# Patient Record
Sex: FEMALE | Race: WHITE | NOT HISPANIC OR LATINO | Employment: FULL TIME | URBAN - NONMETROPOLITAN AREA
[De-identification: names, ages, dates, MRNs, and addresses within clinical notes are randomized per-mention and may not be internally consistent; named-entity substitution may affect disease eponyms.]

---

## 2017-02-10 ENCOUNTER — COMMUNICATION - GICH (OUTPATIENT)
Dept: FAMILY MEDICINE | Facility: OTHER | Age: 27
End: 2017-02-10

## 2017-03-18 ENCOUNTER — AMBULATORY - GICH (OUTPATIENT)
Dept: SCHEDULING | Facility: OTHER | Age: 27
End: 2017-03-18

## 2018-01-03 NOTE — TELEPHONE ENCOUNTER
Patient Information     Patient Name MRSue Mckay [id number] Female 1990      Telephone Encounter by Naomie Post at 2017  8:40 AM     Author:  Naomie Post Service:  (none) Author Type:  NURS- Registered Nurse     Filed:  2017  8:42 AM Encounter Date:  2/10/2017 Status:  Signed     :  Naomie Post (NURS- Registered Nurse)            Depression-in adults 18 and over  SSRI    Office visit in the past 12 months or as indicated in chart.  Should have clinic visit 1-2 months after initial prescription.    Last visit with SHERRY FRAZIER was on: 2016 in [facility]  Next visit with SHERRY FRAZIER is on: No future appointment listed with this provider  Next visit with Family Practice is on: No future appointment listed in this department    Max refills 12 months from last office visit or per providers notes.      PHQ Depression Screening 3/29/2016 2016   Date of PHQ exam (doc flow) 3/29/2016 2016   1. Lack of interest/pleasure 1 - Several days 0 - Not at all   2. Feeling down/depressed 1 - Several days 1 - Several days   PHQ-2 TOTAL SCORE 2 1   3. Trouble sleeping 1 - Several days 0 - Not at all   4. Decreased energy 1 - Several days 2 - More than half the days   5. Appetite change 0 - Not at all 2 - More than half the days   6. Feelings of failure 0 - Not at all 1 - Several days   7. Trouble concentrating 0 - Not at all 1 - Several days   8. Activity level 0 - Not at all 0 - Not at all   9. Hurting yourself 0 - Not at all 0 - Not at all   PHQ-9 TOTAL SCORE 4 7   PHQ-9 Severity Level none mild   Functional Impairment somewhat difficult somewhat difficult     Patient is due for medication management appointment. Limited refill provided at this time and letter sent for reminder to patient. Prescription refilled per RN Medication Refill Policy.................... Naomie Post RN ....................  2017   8:42 AM

## 2018-01-26 ENCOUNTER — DOCUMENTATION ONLY (OUTPATIENT)
Dept: FAMILY MEDICINE | Facility: OTHER | Age: 28
End: 2018-01-26

## 2018-01-26 RX ORDER — ACETAMINOPHEN 325 MG/1
650 TABLET ORAL EVERY 4 HOURS PRN
COMMUNITY
Start: 2015-10-25 | End: 2022-10-19

## 2018-01-26 RX ORDER — MEDROXYPROGESTERONE ACETATE 150 MG/ML
150 INJECTION, SUSPENSION INTRAMUSCULAR
COMMUNITY
Start: 2016-06-20 | End: 2021-04-01

## 2018-01-26 RX ORDER — RIZATRIPTAN BENZOATE 10 MG/1
10 TABLET, ORALLY DISINTEGRATING ORAL
COMMUNITY
Start: 2016-02-18 | End: 2021-04-01

## 2018-01-26 RX ORDER — CYCLOBENZAPRINE HCL 10 MG
10 TABLET ORAL 3 TIMES DAILY
COMMUNITY
Start: 2016-10-27 | End: 2021-04-01

## 2018-01-26 RX ORDER — AMITRIPTYLINE HYDROCHLORIDE 10 MG/1
10 TABLET ORAL AT BEDTIME
COMMUNITY
Start: 2016-05-26 | End: 2021-04-01

## 2018-01-26 RX ORDER — CITALOPRAM HYDROBROMIDE 40 MG/1
40 TABLET ORAL DAILY
COMMUNITY
Start: 2017-02-13 | End: 2018-06-26

## 2018-03-25 ENCOUNTER — HEALTH MAINTENANCE LETTER (OUTPATIENT)
Age: 28
End: 2018-03-25

## 2018-06-26 RX ORDER — PAROXETINE 10 MG/1
10 TABLET, FILM COATED ORAL AT BEDTIME
Qty: 90 TABLET | Refills: 3 | Status: SHIPPED | OUTPATIENT
Start: 2018-06-26 | End: 2021-04-01

## 2018-06-27 ENCOUNTER — TELEPHONE (OUTPATIENT)
Dept: FAMILY MEDICINE | Facility: OTHER | Age: 28
End: 2018-06-27

## 2018-06-27 NOTE — TELEPHONE ENCOUNTER
No answer, unable to leave message.  I did refax her prescription to the [facility] in [place].  Winnie Riddle CMA..............6/27/2018........3:33 PM

## 2018-07-24 NOTE — PROGRESS NOTES
Patient Information     Patient Name  Sue De La Fuente MRN  [id number] Sex  Female   1990      Letter by Hemant Whelan MD at      Author:  Hemant Whelan MD Service:  (none) Author Type:  (none)    Filed:   Encounter Date:  2/10/2017 Status:  (Other)           Sue De La Fuente  [address] [place] [id number]          2017    Dear Ms. De La Fuente:    A LIMITED refill of citalopram (CELEXA) 40 mg tablet has been called into your pharmacy.    Additional refills require a medication management  appointment with Hemant Whelan MD. Please call the clinic at [phone] to schedule your appointment.    Thank you,    The Refill Nurse  [facility]

## 2018-12-21 ENCOUNTER — HOSPITAL ENCOUNTER (EMERGENCY)
Facility: OTHER | Age: 28
Discharge: HOME | End: 2018-12-21
Attending: PHYSICIAN ASSISTANT | Admitting: PHYSICIAN ASSISTANT

## 2018-12-21 VITALS
HEIGHT: 62 IN | TEMPERATURE: 97.6 F | BODY MASS INDEX: 24.84 KG/M2 | RESPIRATION RATE: 16 BRPM | SYSTOLIC BLOOD PRESSURE: 121 MMHG | OXYGEN SATURATION: 97 % | WEIGHT: 135 LBS | DIASTOLIC BLOOD PRESSURE: 78 MMHG

## 2018-12-21 DIAGNOSIS — F32.A DEPRESSION: ICD-10-CM

## 2018-12-21 DIAGNOSIS — Z72.89 DELIBERATE SELF-CUTTING: ICD-10-CM

## 2018-12-21 PROCEDURE — 99283 EMERGENCY DEPT VISIT LOW MDM: CPT | Performed by: PHYSICIAN ASSISTANT

## 2018-12-21 PROCEDURE — 99283 EMERGENCY DEPT VISIT LOW MDM: CPT | Mod: Z6 | Performed by: PHYSICIAN ASSISTANT

## 2018-12-21 RX ORDER — VENLAFAXINE HYDROCHLORIDE 37.5 MG/1
37.5 TABLET, EXTENDED RELEASE ORAL DAILY
Qty: 30 TABLET | Refills: 0 | Status: SHIPPED | OUTPATIENT
Start: 2018-12-21 | End: 2021-04-01

## 2018-12-21 ASSESSMENT — ENCOUNTER SYMPTOMS
BACK PAIN: 0
BRUISES/BLEEDS EASILY: 0
SLEEP DISTURBANCE: 1
DYSPHORIC MOOD: 1
CHILLS: 0
SHORTNESS OF BREATH: 0
CHEST TIGHTNESS: 0
HYPERACTIVE: 0
FEVER: 0
AGITATION: 0
ABDOMINAL PAIN: 0
CONFUSION: 0
WOUND: 0
HEMATURIA: 0
ADENOPATHY: 0
NERVOUS/ANXIOUS: 0

## 2018-12-21 ASSESSMENT — MIFFLIN-ST. JEOR: SCORE: 1295.61

## 2018-12-21 NOTE — ED AVS SNAPSHOT
[facility]  [address]  [place] [address]  Phone:  [phone]  Fax:  [phone]                                    Sue De La Fuente   MRN: [id number]    Department:  [facility]   Date of Visit:  12/21/2018           After Visit Summary Signature Page    I have received my discharge instructions, and my questions have been answered. I have discussed any challenges I see with this plan with the nurse or doctor.    ..........................................................................................................................................  Patient/Patient Representative Signature      ..........................................................................................................................................  Patient Representative Print Name and Relationship to Patient    ..................................................               ................................................  Date                                   Time    ..........................................................................................................................................  Reviewed by Signature/Title    ...................................................              ..............................................  Date                                               Time          22EPIC Rev 08/18

## 2018-12-21 NOTE — PROGRESS NOTES
:    Called Denia,  at notified that patient does not have insurance listed. Denia stated that she would follow up with ED.     TY Odonnell on 12/21/2018 at 12:01 PM

## 2018-12-21 NOTE — DISCHARGE INSTRUCTIONS
The financial worker Denia, should be contacting you to assist with setting up insurance. Her number is [phone]  Your Rx is covered today, please fill at the [facility]

## 2018-12-21 NOTE — ED PROVIDER NOTES
History     Chief Complaint   Patient presents with     Self Injury     from home with PD.      This is a 28-year-old female who admits that she has been having increasing depression and has been struggling with wanting to inflict self-harm.  Denies any suicidal ideation or plan.  Last night she was drinking alcohol when she started cutting with the intent to inflict pain upon herself.  She primarily cut her left forearm.  She then called a friend of hers for support and she subsequently left to go over to his house.  Her roommate and friend woke up this morning and noticed her missing and called [employer].  Who found the patient at her friend's house with blood on a knife as well as cutting to her forearms.  She has been cooperative and is agreeable to establishing a safety plan.  She also admits that her biggest issue is she has no insurance right now and is currently not on any medications.              Problem List:    Patient Active Problem List    Diagnosis Date Noted     Carpal tunnel syndrome of right wrist 05/26/2016     Priority: Medium     Encounter for initial prescription of injectable contraceptive 03/29/2016     Priority: Medium     Post partum depression 01/21/2016     Priority: Medium     Vaginal yeast infection 10/01/2015     Priority: Medium     Recurrent umbilical hernia 08/05/2015     Priority: Medium     Headache 03/28/2012     Priority: Medium        Past Medical History:    Past Medical History:   Diagnosis Date     Headache        Past Surgical History:    Past Surgical History:   Procedure Laterality Date     OTHER SURGICAL HISTORY      2009,,HERNIA REPAIR     OTHER SURGICAL HISTORY      11/17/15,,HERNIA REPAIR,[facility] with [facility] underlay       Family History:    History reviewed. No pertinent family history.    Social History:  Marital Status:  Single [1]  Social History     Tobacco Use     Smoking status: Current Every Day Smoker     Packs/day: 0.50     Years: 10.00     Pack years: 5.00  "    Types: Cigarettes     Smokeless tobacco: Former User     Types: Chew   Substance Use Topics     Alcohol use: Yes     Alcohol/week: 3.0 oz     Types: 5 Cans of beer per week     Drug use: No        Medications:      acetaminophen (TYLENOL) 325 MG tablet   amitriptyline (ELAVIL) 10 MG tablet   cyclobenzaprine (FLEXERIL) 10 MG tablet   medroxyPROGESTERone (DEPO-PROVERA) 150 MG/ML injection   PARoxetine (PAXIL) 10 MG tablet   rizatriptan (MAXALT-MLT) 10 MG ODT tab   traMADol (ULTRAM) 50 MG tablet         Review of Systems   Constitutional: Negative for chills and fever.   HENT: Negative for congestion.    Eyes: Negative for visual disturbance.   Respiratory: Negative for chest tightness and shortness of breath.    Cardiovascular: Negative for chest pain.   Gastrointestinal: Negative for abdominal pain.   Genitourinary: Negative for hematuria.   Musculoskeletal: Negative for back pain.   Skin: Negative for rash and wound.   Neurological: Negative for syncope.   Hematological: Negative for adenopathy. Does not bruise/bleed easily.   Psychiatric/Behavioral: Positive for behavioral problems, dysphoric mood, self-injury and sleep disturbance. Negative for agitation, confusion and suicidal ideas. The patient is not nervous/anxious and is not hyperactive.        Physical Exam   BP: 136/79  Heart Rate: 109  Temp: 97.6  F (36.4  C)  Resp: 18  Height: 157.5 cm (5' 2\")  Weight: 61.2 kg (135 lb)  SpO2: 98 %      Physical Exam   Constitutional: She appears well-developed and well-nourished. No distress.   HENT:   Head: Normocephalic and atraumatic.   Eyes: Conjunctivae and EOM are normal. Pupils are equal, round, and reactive to light. No scleral icterus.   Neck: Neck supple.   Cardiovascular: Normal rate and regular rhythm.   Pulmonary/Chest: Effort normal.   Abdominal: Soft. There is no tenderness.   Musculoskeletal: Normal range of motion. She exhibits no deformity.        Arms:  Multiple superficial abrasions to her left " distal forearm around her wrist.  None are repairable or require suturing.  CMS x4.   Lymphadenopathy:     She has no cervical adenopathy.   Neurological: She is alert.   Skin: Skin is warm and dry. No rash noted. She is not diaphoretic.   Psychiatric: She has a normal mood and affect.       ED Course        Procedures             No results found for this or any previous visit (from the past 24 hour(s)).    Medications - No data to display    Assessments & Plan (with Medical Decision Making)     I have reviewed the nursing notes.    I have reviewed the findings, diagnosis, plan and need for follow up with the patient.         Medication List      Started    venlafaxine 37.5 MG 24 hr tablet  Commonly known as:  EFFEXOR-ER  37.5 mg, Oral, DAILY            Final diagnoses:   Depression   Deliberate self-cutting     Afebrile.  Vital signs stable.  Increasing depression with self cutting behavior.  Patient has had insurance issues and has not not been able to fill her medications.  CRT evaluation and patient is cooperative and agreeable to a safety plan.   referral for assistance with IM care.  Rx for Effexor for depression.  She reports that she has been on this but I do not see this anywhere in her records.  Start her at the initial dose of 37.5 mg.  She will need to follow-up with her primary care provider in 5 days for further evaluation and adjustment of this medication as needed.  Follow-up sooner if there is any other concerns problems or questions 12/21/2018   [facility]     Jake Jensen PA-C  12/21/18 [id number]

## 2018-12-21 NOTE — ED TRIAGE NOTES
"Patient to ER with police. She reports that she was struggling last night with depressive symptoms and drinking, she was cutting last night with the intent to inflict pain upon herself, she denies SI. She reports drinking \"weekly\". She states that she then called a friend for support and went to his house.   Her roommate and friend called PD this morning as patient was missing, and he found blood and knife in kitchen. Her 3 year old son was present with roommate, officers placed him in the care of patient's mother.   Patient calm and cooperative. Agrees to remain safe while in ER.   "

## 2018-12-21 NOTE — PROGRESS NOTES
":    Per PA, patient will need medication at discharge and does not have insurance to cover medication at this time. Called Kennedy at [facility] to discuss Jake Care for patient. Per Kennedy, script can be sent to pharmacy with \"jake care\" written on it. Discussed this with PA and RN. No further needs.     TY Odonnell on 12/21/2018 at 1:37 PM    "

## 2021-04-01 ENCOUNTER — HOSPITAL ENCOUNTER (OUTPATIENT)
Dept: GENERAL RADIOLOGY | Facility: OTHER | Age: 31
End: 2021-04-01
Attending: NURSE PRACTITIONER
Payer: COMMERCIAL

## 2021-04-01 ENCOUNTER — OFFICE VISIT (OUTPATIENT)
Dept: FAMILY MEDICINE | Facility: OTHER | Age: 31
End: 2021-04-01
Attending: NURSE PRACTITIONER
Payer: COMMERCIAL

## 2021-04-01 VITALS
RESPIRATION RATE: 18 BRPM | WEIGHT: 162.31 LBS | HEART RATE: 83 BPM | DIASTOLIC BLOOD PRESSURE: 68 MMHG | OXYGEN SATURATION: 98 % | HEIGHT: 62 IN | TEMPERATURE: 97.7 F | BODY MASS INDEX: 29.87 KG/M2 | SYSTOLIC BLOOD PRESSURE: 134 MMHG

## 2021-04-01 DIAGNOSIS — M25.511 ACUTE PAIN OF RIGHT SHOULDER: Primary | ICD-10-CM

## 2021-04-01 PROCEDURE — G0463 HOSPITAL OUTPT CLINIC VISIT: HCPCS | Mod: 25

## 2021-04-01 PROCEDURE — 73030 X-RAY EXAM OF SHOULDER: CPT | Mod: RT

## 2021-04-01 PROCEDURE — 99213 OFFICE O/P EST LOW 20 MIN: CPT | Performed by: NURSE PRACTITIONER

## 2021-04-01 PROCEDURE — G0463 HOSPITAL OUTPT CLINIC VISIT: HCPCS

## 2021-04-01 ASSESSMENT — PAIN SCALES - GENERAL: PAINLEVEL: SEVERE PAIN (7)

## 2021-04-01 ASSESSMENT — MIFFLIN-ST. JEOR: SCORE: 1409.49

## 2021-04-01 NOTE — PROGRESS NOTES
HPI:    Sue De La Fuente is a 30 year old female  who presents to [facility] today for right shoulder pain and right arm numbness. The patient reports that symptoms started 2 weeks ago. No known injury to her right shoulder. 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 The numbness of her right shoulder started about 1.5 weeks ago. Tried ice and heat, which helps temporarily. The patient has been taking tylenol and ibuprofen, which does not help.      Past Medical History:   Diagnosis Date     Headache     3/28/2012     Past Surgical History:   Procedure Laterality Date     OTHER SURGICAL HISTORY      2009,,HERNIA REPAIR     OTHER SURGICAL HISTORY      11/17/15,,HERNIA REPAIR,[facility] with [facility] underlay     Social History     Tobacco Use     Smoking status: Current Every Day Smoker     Packs/day: 0.50     Years: 10.00     Pack years: 5.00     Types: Cigarettes     Smokeless tobacco: Former User     Types: Chew   Substance Use Topics     Alcohol use: Yes     Alcohol/week: 5.0 standard drinks     Types: 5 Cans of beer per week     Current Outpatient Medications   Medication Sig Dispense Refill     acetaminophen (TYLENOL) 325 MG tablet Take 650 mg by mouth every 4 hours as needed for pain Max acetaminophen dose: 4000mg in 24 hrs.       Allergies   Allergen Reactions     Sulfa Drugs Anaphylaxis     Cats      Dogs      Sumatriptan      Side effects from sumatriptan compounded by using it with her Celexa and Lamictal which may increase blood levels and serotonin effect.  Stay off the sumatriptin at least until she talks to her provider. If they want to use it again, may have to consider a lower dose.          Past medical history, past surgical history, current medications and allergies reviewed and accurate to the best of my knowledge.        ROS:  Refer to HPI    /68 (BP Location: Left arm, Patient Position: Sitting, Cuff Size:  "Adult Regular)   Pulse 83   Temp 97.7  F (36.5  C) (Tympanic)   Resp 18   Ht 1.575 m (5' 2\")   Wt 73.6 kg (162 lb 5 oz)   LMP 03/31/2021 (Exact Date)   SpO2 98%   Breastfeeding No   BMI 29.69 kg/m      EXAM:  General Appearance: Well appearing female, appropriate appearance for age. No acute distress  Musculoskeletal:  Patient is only able to complete 90 degrees of flexion without causing pain and numbness to her right hand. Patient states that as she continues to flex her right shoulder the numbness continues up her right arm and into her shoulder.  Equal movement of bilateral lower extremities.  Normal gait. Patient had full ROM of her neck.    Dermatological: no rashes noted of exposed skin. No erythema, ecchymosis or swelling of the right shoulder.   Psychological: normal affect, alert, oriented, and pleasant.             Xray:  Results for orders placed or performed in visit on 04/01/21   XR Shoulder Right 2 Views     Status: None    Narrative    XR SHOULDER 2 VIEW RIGHT    HISTORY: 30 years Female Acute pain of right shoulder    COMPARISON: None    TECHNIQUE: Right shoulder 2 views    FINDINGS: Joint spaces are congruent. Articular surfaces are smooth.  There is no evidence of fracture or dislocation.      Impression    IMPRESSION: No evidence of fracture or dislocation right shoulder.    JOEL SOL MD           ASSESSMENT/PLAN:  1. Acute pain of right shoulder    - XR Shoulder Right 2 Views  - Orthopedic & Spine  Referral; Future  - PHYSICAL THERAPY REFERRAL; Future    Xray films and radiology report reviewed.     Discussed xray results with the patient and informed her that there were no bony abnormalities seen on xray.     Discussed with the patient that differentials include nerve impingement or tendinitis. Treatment of the patient's right shoulder pain right now includes symptomatic treatment which includes, applying ice/heat to the shoulder, alternating tylenol and ibuprofen and " performing passive ROM exercises.     A referral was ordered for Physical therapy and  for follow up.      Discussed warning signs/symptoms indicative of need to f/u    Follow up if symptoms persist or worsen or concerns      I explained my diagnostic considerations and recommendations to the patient, who voiced understanding and agreement with the treatment plan. All questions were answered. We discussed potential side effects of any prescribed or recommended therapies, as well as expectations for response to treatments.    Disclaimer:  This note consists of words and symbols derived from keyboarding, dictation, or using voice recognition software. As a result, there may be errors in the script that have gone undetected. Please consider this when interpreting information found in this note.

## 2021-04-01 NOTE — NURSING NOTE
Patient presents to clinic experiencing right shoulder pain rated 7, numbness to right arm and she is unable to raise arm straight out.  Medication Reconciliation: complete    Joan Houston LPN

## 2021-04-01 NOTE — PATIENT INSTRUCTIONS
Patient Education     Pendulum (Flexibility)    1. Lean over next to a table, with your left arm supporting your weight on the table.  2. Relax your right arm and let it hang straight down.  3. Slowly begin to swing your right arm in a small [place]. Gradually make the [place] bigger if you can. Change direction after 1 minute of motion.  4. Next, swing your right arm backward and forward. Then move it side to side. Change direction after 1 minute of motion.  5. Repeat these movements for about 5 minutes.  6. Switch sides and repeat if instructed.  7. Do this exercise 3 times a day, or as often as instructed.  [employer] last reviewed this educational content on 2/1/2020 2000-2020 [employer]. All rights reserved. This information is not intended as a substitute for professional medical care. Always follow your healthcare professional's instructions.

## 2021-04-12 ENCOUNTER — OFFICE VISIT (OUTPATIENT)
Dept: FAMILY MEDICINE | Facility: OTHER | Age: 31
End: 2021-04-12
Attending: NURSE PRACTITIONER
Payer: COMMERCIAL

## 2021-04-12 VITALS
DIASTOLIC BLOOD PRESSURE: 74 MMHG | RESPIRATION RATE: 16 BRPM | HEIGHT: 62 IN | TEMPERATURE: 98.1 F | HEART RATE: 86 BPM | SYSTOLIC BLOOD PRESSURE: 132 MMHG | OXYGEN SATURATION: 97 % | BODY MASS INDEX: 29.68 KG/M2 | WEIGHT: 161.31 LBS

## 2021-04-12 DIAGNOSIS — R39.89 SUSPECTED UTI: ICD-10-CM

## 2021-04-12 DIAGNOSIS — R30.0 DYSURIA: ICD-10-CM

## 2021-04-12 DIAGNOSIS — R35.0 URINARY FREQUENCY: Primary | ICD-10-CM

## 2021-04-12 LAB
ALBUMIN UR-MCNC: NEGATIVE MG/DL
APPEARANCE UR: CLEAR
BACTERIA #/AREA URNS HPF: ABNORMAL /HPF
BILIRUB UR QL STRIP: NEGATIVE
COLOR UR AUTO: YELLOW
GLUCOSE UR STRIP-MCNC: NEGATIVE MG/DL
HCG UR QL: NEGATIVE
HGB UR QL STRIP: NEGATIVE
KETONES UR STRIP-MCNC: NEGATIVE MG/DL
LEUKOCYTE ESTERASE UR QL STRIP: NEGATIVE
NITRATE UR QL: POSITIVE
PH UR STRIP: 8 PH (ref 5–9)
RBC #/AREA URNS AUTO: ABNORMAL /HPF
SOURCE: ABNORMAL
SP GR UR STRIP: 1.02 (ref 1–1.03)
UROBILINOGEN UR STRIP-ACNC: 0.2 EU/DL (ref 0.2–1)
WBC #/AREA URNS AUTO: ABNORMAL /HPF

## 2021-04-12 PROCEDURE — 87088 URINE BACTERIA CULTURE: CPT | Mod: ZL | Performed by: NURSE PRACTITIONER

## 2021-04-12 PROCEDURE — 81001 URINALYSIS AUTO W/SCOPE: CPT | Mod: ZL | Performed by: NURSE PRACTITIONER

## 2021-04-12 PROCEDURE — 81025 URINE PREGNANCY TEST: CPT | Mod: ZL | Performed by: NURSE PRACTITIONER

## 2021-04-12 PROCEDURE — 87086 URINE CULTURE/COLONY COUNT: CPT | Mod: ZL | Performed by: NURSE PRACTITIONER

## 2021-04-12 PROCEDURE — 99213 OFFICE O/P EST LOW 20 MIN: CPT | Performed by: NURSE PRACTITIONER

## 2021-04-12 PROCEDURE — G0463 HOSPITAL OUTPT CLINIC VISIT: HCPCS

## 2021-04-12 RX ORDER — PHENAZOPYRIDINE HYDROCHLORIDE 100 MG/1
100 TABLET, FILM COATED ORAL 3 TIMES DAILY PRN
Qty: 6 TABLET | Refills: 0 | Status: SHIPPED | OUTPATIENT
Start: 2021-04-12 | End: 2023-02-02

## 2021-04-12 RX ORDER — NITROFURANTOIN 25; 75 MG/1; MG/1
100 CAPSULE ORAL 2 TIMES DAILY
Qty: 10 CAPSULE | Refills: 0 | Status: SHIPPED | OUTPATIENT
Start: 2021-04-12 | End: 2021-04-17

## 2021-04-12 ASSESSMENT — PAIN SCALES - GENERAL: PAINLEVEL: SEVERE PAIN (7)

## 2021-04-12 ASSESSMENT — MIFFLIN-ST. JEOR: SCORE: 1404.96

## 2021-04-12 NOTE — NURSING NOTE
Patient presents to clinic experiencing urinary frequency, retention, pressure and lower abdominal pain rated 7.   Medication Reconciliation: complete    Joan Houston LPN

## 2021-04-12 NOTE — PROGRESS NOTES
"HPI:    Sue De La Fuente is a 30 year old female  who presents to [facility] today for dysuria, frequency, urgency and pelvic pain. Patient reports that her symptoms started a few days ago. Denies hematuria. Patient reports chills. Denies fever. Denies abnormal vaginal discharge. LMP was 2 weeks ago.     Past Medical History:   Diagnosis Date     Headache     3/28/2012     Past Surgical History:   Procedure Laterality Date     OTHER SURGICAL HISTORY      2009,,HERNIA REPAIR     OTHER SURGICAL HISTORY      11/17/15,,HERNIA REPAIR,[facility] with [facility] underlay     Social History     Tobacco Use     Smoking status: Current Every Day Smoker     Packs/day: 0.50     Years: 10.00     Pack years: 5.00     Types: Cigarettes     Smokeless tobacco: Former User     Types: Chew   Substance Use Topics     Alcohol use: Yes     Alcohol/week: 5.0 standard drinks     Types: 5 Cans of beer per week     Current Outpatient Medications   Medication Sig Dispense Refill     acetaminophen (TYLENOL) 325 MG tablet Take 650 mg by mouth every 4 hours as needed for pain Max acetaminophen dose: 4000mg in 24 hrs.       Allergies   Allergen Reactions     Sulfa Drugs Anaphylaxis     Cats      Dogs      Sumatriptan      Side effects from sumatriptan compounded by using it with her Celexa and Lamictal which may increase blood levels and serotonin effect.  Stay off the sumatriptin at least until she talks to her provider. If they want to use it again, may have to consider a lower dose.          Past medical history, past surgical history, current medications and allergies reviewed and accurate to the best of my knowledge.        ROS:  Refer to HPI    /74 (BP Location: Left arm, Patient Position: Sitting, Cuff Size: Adult Regular)   Pulse 86   Temp 98.1  F (36.7  C) (Tympanic)   Resp 16   Ht 1.575 m (5' 2\")   Wt 73.2 kg (161 lb 5 oz)   LMP 03/31/2021 (Exact Date)   SpO2 97%   Breastfeeding No   BMI 29.50 kg/m      EXAM:  General " Appearance: Well appearing female, appropriate appearance for age. No acute distress  Abdomen: soft, nontender, no rigidity, no rebound tenderness or guarding, normal bowel sounds present  :  Patient reports suprapubic tenderness to palpation.  No CVA tenderness to palpation.    Psychological: normal affect, alert, oriented, and pleasant.       Labs:  Results for orders placed or performed in visit on 04/12/21   *UA reflex to Microscopic     Status: Abnormal   Result Value Ref Range    Color Urine Yellow     Appearance Urine Clear     Glucose Urine Negative NEG^Negative mg/dL    Bilirubin Urine Negative NEG^Negative    Ketones Urine Negative NEG^Negative mg/dL    Specific Gravity Urine 1.020 1.000 - 1.030    Blood Urine Negative NEG^Negative    pH Urine 8.0 5.0 - 9.0 pH    Protein Albumin Urine Negative NEG^Negative mg/dL    Urobilinogen Urine 0.2 0.2 - 1.0 EU/dL    Nitrite Urine Positive (A) NEG^Negative    Leukocyte Esterase Urine Negative NEG^Negative    Source Midstream Urine    Pregnancy, Urine (HCG)     Status: None   Result Value Ref Range    HCG Qual Urine Negative NEG^Negative   Urine Microscopic     Status: Abnormal   Result Value Ref Range    WBC Urine 0 - 5 OTO5^0 - 5 /HPF    RBC Urine O - 2 OTO2^O - 2 /HPF    Bacteria Urine Moderate (A) NEG^Negative /HPF                 ASSESSMENT/PLAN:  1. Urinary frequency    - UA reflex to Microscopic; Future  - *UA reflex to Microscopic  - Pregnancy, Urine (HCG)  - Urine Culture Aerobic Bacterial  - Urine Microscopic    2. Suspected UTI    - nitroFURantoin macrocrystal-monohydrate (MACROBID) 100 MG capsule; Take 1 capsule (100 mg) by mouth 2 times daily for 5 days  Dispense: 10 capsule; Refill: 0    3. Dysuria    - phenazopyridine (PYRIDIUM) 100 MG tablet; Take 1 tablet (100 mg) by mouth 3 times daily as needed for urinary tract discomfort  Dispense: 6 tablet; Refill: 0    UA showed positive nitrites.     Urine microscopic showed moderate amount of bacteria.      Discussed urine results with the patient and informed her that she will be treated for a suspected UTI based on her symptoms and urine results. Patient was prescribed macrobid.     The patient was also prescribed pyridium PRN for her dysuria.       Encouraged fluids.    May use over-the-counter Tylenol or ibuprofen PRN    Discussed warning signs/symptoms indicative of need to f/u    Follow up if symptoms persist or worsen or concerns      I explained my diagnostic considerations and recommendations to the patient, who voiced understanding and agreement with the treatment plan. All questions were answered. We discussed potential side effects of any prescribed or recommended therapies, as well as expectations for response to treatments.    Disclaimer:  This note consists of words and symbols derived from keyboarding, dictation, or using voice recognition software. As a result, there may be errors in the script that have gone undetected. Please consider this when interpreting information found in this note.

## 2021-04-12 NOTE — PATIENT INSTRUCTIONS

## 2021-04-14 LAB
BACTERIA SPEC CULT: ABNORMAL
SPECIMEN SOURCE: ABNORMAL

## 2022-10-03 ENCOUNTER — OFFICE VISIT (OUTPATIENT)
Dept: FAMILY MEDICINE | Facility: OTHER | Age: 32
End: 2022-10-03
Attending: NURSE PRACTITIONER
Payer: COMMERCIAL

## 2022-10-03 VITALS
SYSTOLIC BLOOD PRESSURE: 124 MMHG | RESPIRATION RATE: 18 BRPM | DIASTOLIC BLOOD PRESSURE: 84 MMHG | WEIGHT: 158 LBS | OXYGEN SATURATION: 97 % | BODY MASS INDEX: 28.9 KG/M2 | TEMPERATURE: 97.8 F | HEART RATE: 90 BPM

## 2022-10-03 DIAGNOSIS — J40 BRONCHITIS: ICD-10-CM

## 2022-10-03 DIAGNOSIS — J02.9 SORE THROAT: Primary | ICD-10-CM

## 2022-10-03 DIAGNOSIS — T36.95XA ANTIBIOTIC-INDUCED YEAST INFECTION: ICD-10-CM

## 2022-10-03 DIAGNOSIS — J03.90 TONSILLITIS: ICD-10-CM

## 2022-10-03 DIAGNOSIS — B37.9 ANTIBIOTIC-INDUCED YEAST INFECTION: ICD-10-CM

## 2022-10-03 LAB — GROUP A STREP BY PCR: NOT DETECTED

## 2022-10-03 PROCEDURE — 87651 STREP A DNA AMP PROBE: CPT | Mod: ZL | Performed by: PHYSICIAN ASSISTANT

## 2022-10-03 PROCEDURE — G0463 HOSPITAL OUTPT CLINIC VISIT: HCPCS

## 2022-10-03 PROCEDURE — 99213 OFFICE O/P EST LOW 20 MIN: CPT | Performed by: PHYSICIAN ASSISTANT

## 2022-10-03 RX ORDER — AMOXICILLIN 875 MG
875 TABLET ORAL 2 TIMES DAILY
Qty: 14 TABLET | Refills: 0 | Status: SHIPPED | OUTPATIENT
Start: 2022-10-03 | End: 2022-10-10

## 2022-10-03 RX ORDER — FLUCONAZOLE 150 MG/1
150 TABLET ORAL
Qty: 3 TABLET | Refills: 0 | Status: SHIPPED | OUTPATIENT
Start: 2022-10-03 | End: 2022-10-10

## 2022-10-03 ASSESSMENT — PAIN SCALES - GENERAL: PAINLEVEL: MODERATE PAIN (4)

## 2022-10-03 NOTE — LETTER
[facility]  [address]  [place] [address]  Phone: [phone]  Fax: [phone]    October 3, 2022        Sue De La Fuente  [address] [place] [id number]          To whom it may concern:    RE: Sue De La Fuente    Patient was seen and treated today at our clinic and missed work 10/3/22 and 10/4/22.     Please contact me for questions or concerns.      Sincerely,        Laura Coronel PA-C

## 2022-10-03 NOTE — NURSING NOTE
"Chief Complaint   Patient presents with     Cough     For over a week     She has had cough, congestion,fever, fatigue and a sore throat for over a week.  Martha Valenzuela LPN..................10/3/2022   6:42 PM    Initial /84   Pulse 90   Temp 97.8  F (36.6  C) (Temporal)   Resp 18   Wt 71.7 kg (158 lb)   LMP 10/01/2022   SpO2 97%   Breastfeeding No   BMI 28.90 kg/m   Estimated body mass index is 28.9 kg/m  as calculated from the following:    Height as of 4/12/21: 1.575 m (5' 2\").    Weight as of this encounter: 71.7 kg (158 lb).  Medication Reconciliation: complete    FOOD SECURITY SCREENING QUESTIONS  Hunger Vital Signs:  Within the past 12 months we worried whether our food would run out before we got money to buy more. Never  Within the past 12 months the food we bought just didn't last and we didn't have money to get more. Never        Advance care directive on file? no  Advance care directive provided to patient? declined     Martha Valenzuela, LLUVIA  "

## 2022-10-03 NOTE — PROGRESS NOTES
ASSESSMENT/PLAN:    I have reviewed the nursing notes.  I have reviewed the findings, diagnosis, plan and need for follow up with the patient.    1. Sore throat  - Group A Streptococcus PCR Throat Swab  - Strep in process, will update on results as available    2. Bronchitis  - amoxicillin (AMOXIL) 875 MG tablet; Take 1 tablet (875 mg) by mouth 2 times daily for 7 days  Dispense: 14 tablet; Refill: 0  - Vital signs stable. PE consistent with URI. Recommend: increased fluids, rest, use of humidifier, antitussives (cough medication for adults), alternating tylenol and ibuprofen every 4-6 hours as needed (if able to take these medications), salt water gargles for sore throats or throat lozenges, honey, netti pots/saline rinses for sinus/congestion, as well as other home remedies. If worsening fevers, chills, uncontrollable nausea/vomiting, dehydration,etc., or other worsening signs occur, patient is agreeable to follow up for reevaluation. Cough may persist for 6-8 weeks. Tobacco cessation recommended.     3. Tonsillitis  - amoxicillin (AMOXIL) 875 MG tablet; Take 1 tablet (875 mg) by mouth 2 times daily for 7 days  Dispense: 14 tablet; Refill: 0  - Vital signs stable. PE notable for posterior pharyngeal erythema and tonsil findings include: 3+. Strep test in process. Discussed that for tonsillitis we typically treat with antibiotics with PCN class being first line unless allergy present. Patient was placed on Amoxicillin (875 mg dosing to cover respiratory pathology, 7 day course for this reason). Recommend taking entire course of antibiotic even if feeling better prior to this.  Recommend changing toothbrush on day 2.  Recommend alternating Tylenol and ibuprofen every 4-6 hours as needed (do not exceed daily limits of 4000 mg of Tylenol and 3200 mg of ibuprofen daily).  Also recommend salt water gargles, humidifier, throat lozenges if old enough not to be a choking hazard, warm honey if greater than 12 months in age,  other home remedies as needed.  May also benefit from using a humidifier.  If changing or worsening symptoms such as: Worsening fevers, pain, inability to handle own secretions, etc., recommend follow-up. Patient is in agreement and understanding of the above treatment plan. All questions and concerns were addressed and answered to patient's satisfaction. AVS reviewed with patient.     4. Antibiotic-induced yeast infection  - fluconazole (DIFLUCAN) 150 MG tablet; Take 1 tablet (150 mg) by mouth every 3 days for 3 doses  Dispense: 3 tablet; Refill: 0  - Take medication with food. Increase yogurt/probiotic. Diflucan x3 provided.   Discussed warning signs/symptoms indicative of need to f/u    Follow up if symptoms persist or worsen or concerns    I explained my diagnostic considerations and recommendations to the patient, who voiced understanding and agreement with the treatment plan. All questions were answered. We discussed potential side effects of any prescribed or recommended therapies, as well as expectations for response to treatments.    Laura Coronel PA-C  10/3/2022  6:38 PM    HPI:    Sue De La Fuente is a 32 year old female  who presents to [facility] today for concerns of URI, x 1 week    Symptoms:  Yes fevers or chills. Fever, highest reported temperature: 101.9 F  Yes sore throat/pharyngitis/tonsillitis.   Yes allergy/URI Symptoms  No Muffled Sounds/Change in Hearing  No Sensation of Fullness in Ear(s)  No Ringing in Ears/Tinnitus  No Balance Changes  No Dizziness  Yes Congestion (head/nasal/chest)  Yes Cough/Productive Cough  Yes Post Nasal Drip - color: clear  Yes Headache  Yes Sinus Pain/Pressure  No Myalgias  No Otalgia  Activity Level Changes: Yes: tired  Appetite/Liquid Intake Changes: No  Changes to Bowel Habits: No  Changes to Bladder Habits: No    Treatments tried: Tylenol/Ibuprofen, OTC Cough med, Fluids and Rest    Site of exposure: workplace  Type of exposure: not known    Vaccination  status:   - Influenza: not immunized at this time  - COVID: 8/17/21, 9/7/21    Cardiopulmonary History:  Recent Infections (Pneumonia, etc): No  Smoker: Yes  Asthma: No  COPD: No    PCP: MD [facility]    Past Medical History:   Diagnosis Date     Headache     3/28/2012     Past Surgical History:   Procedure Laterality Date     OTHER SURGICAL HISTORY      2009,,HERNIA REPAIR     OTHER SURGICAL HISTORY      11/17/15,,HERNIA REPAIR,[facility] with [facility] underlay     Social History     Tobacco Use     Smoking status: Current Every Day Smoker     Packs/day: 0.50     Years: 10.00     Pack years: 5.00     Types: Cigarettes     Smokeless tobacco: Former User     Types: Chew   Substance Use Topics     Alcohol use: Yes     Alcohol/week: 5.0 standard drinks     Types: 5 Cans of beer per week     Current Outpatient Medications   Medication Sig Dispense Refill     amoxicillin (AMOXIL) 875 MG tablet Take 1 tablet (875 mg) by mouth 2 times daily for 7 days 14 tablet 0     fluconazole (DIFLUCAN) 150 MG tablet Take 1 tablet (150 mg) by mouth every 3 days for 3 doses 3 tablet 0     acetaminophen (TYLENOL) 325 MG tablet Take 650 mg by mouth every 4 hours as needed for pain Max acetaminophen dose: 4000mg in 24 hrs. (Patient not taking: Reported on 10/3/2022)       phenazopyridine (PYRIDIUM) 100 MG tablet Take 1 tablet (100 mg) by mouth 3 times daily as needed for urinary tract discomfort (Patient not taking: Reported on 10/3/2022) 6 tablet 0     Allergies   Allergen Reactions     Sulfa Drugs Anaphylaxis     Cats      Dogs      Sumatriptan      Side effects from sumatriptan compounded by using it with her Celexa and Lamictal which may increase blood levels and serotonin effect.  Stay off the sumatriptin at least until she talks to her provider. If they want to use it again, may have to consider a lower dose.      Past medical history, past surgical history, current medications and allergies reviewed and accurate to the best of my knowledge.       ROS:  Refer to HPI    /84   Pulse 90   Temp 97.8  F (36.6  C) (Temporal)   Resp 18   Wt 71.7 kg (158 lb)   LMP 10/01/2022   SpO2 97%   Breastfeeding No   BMI 28.90 kg/m      EXAM:  General Appearance: Well appearing 32 year old female, appropriate appearance for age. No acute distress   Ears: Left TM intact, translucent with bony landmarks appreciated, no erythema, no effusion, no bulging, no purulence.  Right TM intact, translucent with bony landmarks appreciated, no erythema, no effusion, no bulging, no purulence.  Left auditory canal clear.  Right auditory canal clear.  Normal external ears, non tender.  Eyes: conjunctivae normal without erythema or irritation, corneas clear, no drainage or crusting, no eyelid swelling, pupils equal   Oropharynx: moist mucous membranes, posterior pharynx with erythema, tonsils 3+, + erythema, + exudates or petechiae, no post nasal drip seen, no trismus, voice clear.    Sinuses:  No sinus tenderness upon palpation of the frontal or maxillary sinuses  Nose:  Bilateral nares: no erythema, no edema, no drainage or congestion   Neck: supple without adenopathy  Respiratory: normal chest wall and respirations.  Normal effort.  No crackles or rhonchi.  No increased work of breathing. Harsh dry cough, mild wheezing bilateral lower lobes.  Cardiac: RRR with no murmurs  Abdomen: soft, nontender, no rigidity, no rebound tenderness or guarding, normal bowel sounds present  Dermatological: no rashes noted of exposed skin  Psychological: normal affect, alert, oriented, and pleasant.     Labs:  Strep in process  Negative home COVID tests, declines test today    Xray:  None

## 2022-10-03 NOTE — PATIENT INSTRUCTIONS
Discussed that for tonsillitis we typically treat with antibiotics with PCN class being first line unless allergy present. Patient was placed on Amoxicillin x twice a day for 7 days. Recommend taking entire course of antibiotic even if feeling better prior to this.  Recommend changing toothbrush on day 2.  Recommend alternating Tylenol and ibuprofen every 4-6 hours as needed (do not exceed daily limits of 4000 mg of Tylenol and 3200 mg of ibuprofen daily).      You were prescribed an antibiotic, please take into consideration the following information:  - Take entire course of antibiotic even if you start to feel better.  - Antibiotics can cause stomach upset including nausea and diarrhea. Read your bottle or ask the pharmacist if antibiotic can be taken with food to help prevent nausea. If you have symptoms of diarrhea you can take an over-the-counter probiotic and/or increase foods with probiotics such as yogurt, Thiago, sauerkraut.  -Use caution in sunlight as can lead to increased risk of sunburn while on ABX (antibiotics).

## 2022-10-12 ENCOUNTER — E-VISIT (OUTPATIENT)
Dept: FAMILY MEDICINE | Facility: OTHER | Age: 32
End: 2022-10-12
Attending: FAMILY MEDICINE
Payer: COMMERCIAL

## 2022-10-12 DIAGNOSIS — J20.9 ACUTE BRONCHITIS, UNSPECIFIED ORGANISM: Primary | ICD-10-CM

## 2022-10-12 PROCEDURE — 99421 OL DIG E/M SVC 5-10 MIN: CPT | Performed by: FAMILY MEDICINE

## 2022-10-12 RX ORDER — PREDNISONE 20 MG/1
20 TABLET ORAL 2 TIMES DAILY
Qty: 10 TABLET | Refills: 0 | Status: SHIPPED | OUTPATIENT
Start: 2022-10-12 | End: 2022-10-17

## 2022-10-12 NOTE — PATIENT INSTRUCTIONS
Thank you for choosing us for your care. I have placed an order for a prescription so that you can start treatment. View your full visit summary for details by clicking on the link below. Your pharmacist will able to address any questions you may have about the medication.     If you're not feeling better within 5-7 days, please schedule an appointment.  You can schedule an appointment right here in [facility], or call [phone]  If the visit is for the same symptoms as your eVisit, we'll refund the cost of your eVisit if seen within seven days.

## 2022-10-18 ENCOUNTER — E-VISIT (OUTPATIENT)
Dept: FAMILY MEDICINE | Facility: OTHER | Age: 32
End: 2022-10-18
Payer: COMMERCIAL

## 2022-10-18 DIAGNOSIS — J20.9 ACUTE BRONCHITIS, UNSPECIFIED ORGANISM: Primary | ICD-10-CM

## 2022-10-18 PROCEDURE — 99421 OL DIG E/M SVC 5-10 MIN: CPT | Performed by: FAMILY MEDICINE

## 2022-10-19 ENCOUNTER — HOSPITAL ENCOUNTER (OUTPATIENT)
Dept: GENERAL RADIOLOGY | Facility: OTHER | Age: 32
Discharge: HOME | End: 2022-10-19
Attending: FAMILY MEDICINE | Admitting: FAMILY MEDICINE
Payer: COMMERCIAL

## 2022-10-19 DIAGNOSIS — J20.9 ACUTE BRONCHITIS, UNSPECIFIED ORGANISM: ICD-10-CM

## 2022-10-19 PROCEDURE — 71046 X-RAY EXAM CHEST 2 VIEWS: CPT

## 2023-01-15 ENCOUNTER — HEALTH MAINTENANCE LETTER (OUTPATIENT)
Age: 33
End: 2023-01-15

## 2023-02-02 ENCOUNTER — OFFICE VISIT (OUTPATIENT)
Dept: FAMILY MEDICINE | Facility: OTHER | Age: 33
End: 2023-02-02
Attending: FAMILY MEDICINE
Payer: COMMERCIAL

## 2023-02-02 VITALS
SYSTOLIC BLOOD PRESSURE: 120 MMHG | DIASTOLIC BLOOD PRESSURE: 68 MMHG | OXYGEN SATURATION: 96 % | HEART RATE: 89 BPM | RESPIRATION RATE: 20 BRPM | BODY MASS INDEX: 26.51 KG/M2 | WEIGHT: 149.6 LBS | TEMPERATURE: 98.2 F | HEIGHT: 63 IN

## 2023-02-02 DIAGNOSIS — Z00.00 ROUTINE GENERAL MEDICAL EXAMINATION AT A HEALTH CARE FACILITY: Primary | ICD-10-CM

## 2023-02-02 DIAGNOSIS — Z13.220 LIPID SCREENING: ICD-10-CM

## 2023-02-02 DIAGNOSIS — Z11.59 NEED FOR HEPATITIS C SCREENING TEST: ICD-10-CM

## 2023-02-02 DIAGNOSIS — F33.0 MILD RECURRENT MAJOR DEPRESSION (H): ICD-10-CM

## 2023-02-02 DIAGNOSIS — Z12.4 CERVICAL CANCER SCREENING: ICD-10-CM

## 2023-02-02 LAB
CHOLEST SERPL-MCNC: 260 MG/DL
HDLC SERPL-MCNC: 60 MG/DL
LDLC SERPL CALC-MCNC: 178 MG/DL
NONHDLC SERPL-MCNC: 200 MG/DL
TRIGL SERPL-MCNC: 111 MG/DL

## 2023-02-02 PROCEDURE — 86803 HEPATITIS C AB TEST: CPT | Mod: ZL | Performed by: FAMILY MEDICINE

## 2023-02-02 PROCEDURE — 90686 IIV4 VACC NO PRSV 0.5 ML IM: CPT

## 2023-02-02 PROCEDURE — 87624 HPV HI-RISK TYP POOLED RSLT: CPT | Mod: ZL | Performed by: FAMILY MEDICINE

## 2023-02-02 PROCEDURE — 83718 ASSAY OF LIPOPROTEIN: CPT | Mod: ZL | Performed by: FAMILY MEDICINE

## 2023-02-02 PROCEDURE — G0123 SCREEN CERV/VAG THIN LAYER: HCPCS | Performed by: FAMILY MEDICINE

## 2023-02-02 PROCEDURE — 90677 PCV20 VACCINE IM: CPT

## 2023-02-02 PROCEDURE — G0008 ADMIN INFLUENZA VIRUS VAC: HCPCS

## 2023-02-02 PROCEDURE — 36415 COLL VENOUS BLD VENIPUNCTURE: CPT | Mod: ZL | Performed by: FAMILY MEDICINE

## 2023-02-02 PROCEDURE — 99395 PREV VISIT EST AGE 18-39: CPT | Performed by: FAMILY MEDICINE

## 2023-02-02 RX ORDER — BUPROPION HYDROCHLORIDE 150 MG/1
150 TABLET ORAL EVERY MORNING
Qty: 90 TABLET | Refills: 4 | Status: SHIPPED | OUTPATIENT
Start: 2023-02-02 | End: 2023-06-13

## 2023-02-02 ASSESSMENT — ANXIETY QUESTIONNAIRES
5. BEING SO RESTLESS THAT IT IS HARD TO SIT STILL: SEVERAL DAYS
GAD7 TOTAL SCORE: 10
GAD7 TOTAL SCORE: 10
8. IF YOU CHECKED OFF ANY PROBLEMS, HOW DIFFICULT HAVE THESE MADE IT FOR YOU TO DO YOUR WORK, TAKE CARE OF THINGS AT HOME, OR GET ALONG WITH OTHER PEOPLE?: SOMEWHAT DIFFICULT
4. TROUBLE RELAXING: MORE THAN HALF THE DAYS
IF YOU CHECKED OFF ANY PROBLEMS ON THIS QUESTIONNAIRE, HOW DIFFICULT HAVE THESE PROBLEMS MADE IT FOR YOU TO DO YOUR WORK, TAKE CARE OF THINGS AT HOME, OR GET ALONG WITH OTHER PEOPLE: SOMEWHAT DIFFICULT
3. WORRYING TOO MUCH ABOUT DIFFERENT THINGS: MORE THAN HALF THE DAYS
6. BECOMING EASILY ANNOYED OR IRRITABLE: MORE THAN HALF THE DAYS
1. FEELING NERVOUS, ANXIOUS, OR ON EDGE: SEVERAL DAYS
7. FEELING AFRAID AS IF SOMETHING AWFUL MIGHT HAPPEN: SEVERAL DAYS
7. FEELING AFRAID AS IF SOMETHING AWFUL MIGHT HAPPEN: SEVERAL DAYS
GAD7 TOTAL SCORE: 10
2. NOT BEING ABLE TO STOP OR CONTROL WORRYING: SEVERAL DAYS

## 2023-02-02 ASSESSMENT — PATIENT HEALTH QUESTIONNAIRE - PHQ9
SUM OF ALL RESPONSES TO PHQ QUESTIONS 1-9: 11
10. IF YOU CHECKED OFF ANY PROBLEMS, HOW DIFFICULT HAVE THESE PROBLEMS MADE IT FOR YOU TO DO YOUR WORK, TAKE CARE OF THINGS AT HOME, OR GET ALONG WITH OTHER PEOPLE: SOMEWHAT DIFFICULT
SUM OF ALL RESPONSES TO PHQ QUESTIONS 1-9: 11

## 2023-02-02 ASSESSMENT — ENCOUNTER SYMPTOMS
EYE PAIN: 0
PALPITATIONS: 0
ARTHRALGIAS: 1
FREQUENCY: 0
SORE THROAT: 0
JOINT SWELLING: 1
CHILLS: 0
COUGH: 0
WEAKNESS: 0
SHORTNESS OF BREATH: 0
MYALGIAS: 1
DIZZINESS: 0
DIARRHEA: 0
NAUSEA: 0
HEMATURIA: 0
FEVER: 0
HEMATOCHEZIA: 0
HEADACHES: 0
DYSURIA: 0
HEARTBURN: 0
PARESTHESIAS: 0
ABDOMINAL PAIN: 0
NERVOUS/ANXIOUS: 1
CONSTIPATION: 1
BREAST MASS: 0

## 2023-02-02 ASSESSMENT — PAIN SCALES - GENERAL: PAINLEVEL: MODERATE PAIN (5)

## 2023-02-02 NOTE — NURSING NOTE
"Chief Complaint   Patient presents with     Physical     Annual with pap         Initial /68   Pulse 89   Temp 98.2  F (36.8  C) (Tympanic)   Resp 20   Ht 1.6 m (5' 3\")   Wt 67.9 kg (149 lb 9.6 oz)   LMP 01/16/2023 (Exact Date)   SpO2 96%   Breastfeeding No   BMI 26.50 kg/m   Estimated body mass index is 26.5 kg/m  as calculated from the following:    Height as of this encounter: 1.6 m (5' 3\").    Weight as of this encounter: 67.9 kg (149 lb 9.6 oz).         Norma J. Gosselin, LLUVIA   "

## 2023-02-02 NOTE — PROGRESS NOTES
SUBJECTIVE:   CC: Sue is an 32 year old who presents for preventive health visit.     Patient has been advised of split billing requirements and indicates understanding: Yes  Healthy Habits:     Getting at least 3 servings of Calcium per day:  NO    Bi-annual eye exam:  NO    Dental care twice a year:  NO    Sleep apnea or symptoms of sleep apnea:  Daytime drowsiness    Diet:  Regular (no restrictions)    Frequency of exercise:  1 day/week    Duration of exercise:  30-45 minutes    Taking medications regularly:  Yes    Medication side effects:  None    PHQ-2 Total Score: 2    Additional concerns today:  Yes              Today's PHQ-2 Score:   PHQ-2 ( 1999 [employer]) 2/2/2023   Q1: Little interest or pleasure in doing things 1   Q2: Feeling down, depressed or hopeless 1   PHQ-2 Score 2   PHQ-2 Total Score (12-17 Years)- Positive if 3 or more points; Administer PHQ-A if positive -   Q1: Little interest or pleasure in doing things Several days   Q2: Feeling down, depressed or hopeless Several days   PHQ-2 Score 2       Have you ever done Advance Care Planning? (For example, a Health Directive, POLST, or a discussion with a medical provider or your loved ones about your wishes): No, advance care planning information given to patient to review.  Patient plans to discuss their wishes with loved ones or provider.      Social History     Tobacco Use     Smoking status: Every Day     Packs/day: 0.50     Years: 10.00     Pack years: 5.00     Types: Cigarettes     Smokeless tobacco: Former     Types: Chew   Substance Use Topics     Alcohol use: Yes     Alcohol/week: 5.0 standard drinks     Types: 5 Cans of beer per week     If you drink alcohol do you typically have >3 drinks per day or >7 drinks per week? Not applicable    Alcohol Use 2/2/2023   Prescreen: >3 drinks/day or >7 drinks/week? No   Prescreen: >3 drinks/day or >7 drinks/week? -   No flowsheet data found.    Reviewed orders with patient.  Reviewed health  maintenance and updated orders accordingly - Yes  Labs reviewed in [facility]  Current Outpatient Medications   Medication Sig Dispense Refill     buPROPion (WELLBUTRIN XL) 150 MG 24 hr tablet Take 1 tablet (150 mg) by mouth every morning 90 tablet 4     Allergies   Allergen Reactions     Sulfa Drugs Anaphylaxis     Cats      Dogs      Sumatriptan      Side effects from sumatriptan compounded by using it with her Celexa and Lamictal which may increase blood levels and serotonin effect.  Stay off the sumatriptin at least until she talks to her provider. If they want to use it again, may have to consider a lower dose.        Breast Cancer Screening:  Any new diagnosis of family breast, ovarian, or bowel cancer? No    FHS-7: No flowsheet data found.    Patient under 40 years of age: Routine Mammogram Screening not recommended.   Pertinent mammograms are reviewed under the imaging tab.    History of abnormal Pap smear: NO - age 30-65 PAP every 5 years with negative HPV co-testing recommended     Reviewed and updated as needed this visit by clinical staff   Tobacco  Allergies  Meds   Med Hx  Surg Hx  Fam Hx          Reviewed and updated as needed this visit by Provider                 Past Medical History:   Diagnosis Date     Headache     3/28/2012      Past Surgical History:   Procedure Laterality Date     OTHER SURGICAL HISTORY      2009,,HERNIA REPAIR     OTHER SURGICAL HISTORY      11/17/15,,HERNIA REPAIR,[facility] with mesh underlay       Review of Systems   Constitutional: Negative for chills and fever.   HENT: Negative for congestion, ear pain, hearing loss and sore throat.    Eyes: Negative for pain and visual disturbance.   Respiratory: Negative for cough and shortness of breath.    Cardiovascular: Negative for chest pain, palpitations and peripheral edema.   Gastrointestinal: Positive for constipation. Negative for abdominal pain, diarrhea, heartburn, hematochezia and nausea.   Breasts:  Negative for  "tenderness, breast mass and discharge.   Genitourinary: Negative for dysuria, frequency, genital sores, hematuria, pelvic pain, urgency, vaginal bleeding and vaginal discharge.   Musculoskeletal: Positive for arthralgias, joint swelling and myalgias.   Skin: Negative for rash.   Neurological: Negative for dizziness, weakness, headaches and paresthesias.   Psychiatric/Behavioral: Positive for mood changes. The patient is nervous/anxious.     right shoulder pain with reaching overhead.  Nearly dislocated it at age 16 falling off a horse. Using tylenol and ibuprofen.     PHQ 3/29/2016 2016 2023   PHQ-9 Total Score 4 7 11   Q9: Thoughts of better off dead/self-harm past 2 weeks Not at all Not at all Not at all          Actively trying to get pregnant, her partner has never had a child.    Is on PNV already.  Menses monthly.      OBJECTIVE:   /68   Pulse 89   Temp 98.2  F (36.8  C) (Tympanic)   Resp 20   Ht 1.6 m (5' 3\")   Wt 67.9 kg (149 lb 9.6 oz)   LMP 2023 (Exact Date)   SpO2 96%   Breastfeeding No   BMI 26.50 kg/m    Physical Exam  GENERAL: healthy, alert and no distress  EYES: Eyes grossly normal to inspection, PERRL and conjunctivae and sclerae normal  HENT: ear canals and TM's normal, nose and mouth without ulcers or lesions  NECK: no adenopathy, no asymmetry, masses, or scars and thyroid normal to palpation  RESP: lungs clear to auscultation - no rales, rhonchi or wheezes  CV: regular rate and rhythm, normal S1 S2, no S3 or S4, no murmur, click or rub, no peripheral edema and peripheral pulses strong  ABDOMEN: soft, nontender, no hepatosplenomegaly, no masses and bowel sounds normal   (female): normal female external genitalia, normal urethral meatus, vaginal mucosa pink, moist, well rugated, and normal cervix/adnexa/uterus without masses or discharge. nsg present.  Pap smear obtained  MS: no gross musculoskeletal defects noted, no edema  SKIN: no suspicious lesions or " rashes  NEURO: Normal strength and tone, mentation intact and speech normal  PSYCH: mentation appears normal, affect normal/bright    Diagnostic Test Results:  Labs reviewed in Epic  Results for orders placed or performed in visit on 02/02/23   Lipid Panel     Status: Abnormal   Result Value Ref Range    Cholesterol 260 (H) <200 mg/dL    Triglycerides 111 <150 mg/dL    Direct Measure HDL 60 >=50 mg/dL    LDL Cholesterol Calculated 178 (H) <=100 mg/dL    Non HDL Cholesterol 200 (H) <130 mg/dL    Narrative    Cholesterol  Desirable:  <200 mg/dL    Triglycerides  Normal:  Less than 150 mg/dL  Borderline High:  150-199 mg/dL  High:  200-499 mg/dL  Very High:  Greater than or equal to 500 mg/dL    Direct Measure HDL  Female:  Greater than or equal to 50 mg/dL   Male:  Greater than or equal to 40 mg/dL    LDL Cholesterol  Desirable:  <100mg/dL  Above Desirable:  100-129 mg/dL   Borderline High:  130-159 mg/dL   High:  160-189 mg/dL   Very High:  >= 190 mg/dL    Non HDL Cholesterol  Desirable:  130 mg/dL  Above Desirable:  130-159 mg/dL  Borderline High:  160-189 mg/dL  High:  190-219 mg/dL  Very High:  Greater than or equal to 220 mg/dL       ASSESSMENT/PLAN:       ICD-10-CM    1. Routine general medical examination at a health care facility  Z00.00       2. Need for hepatitis C screening test  Z11.59 Hepatitis C Screen Reflex to HCV RNA Quant and Genotype     Hepatitis C Screen Reflex to HCV RNA Quant and Genotype      3. Cervical cancer screening  Z12.4 Pap Screen with HPV - recommended age 30 - 65 years     HPV High Risk Types DNA Cervical      4. Lipid screening  Z13.220 Lipid Panel     Lipid Panel      5. Mild recurrent major depression (H)  F33.0 buPROPion (WELLBUTRIN XL) 150 MG 24 hr tablet        She is willing to start antidepressant meds, and perhaps stop smoking. Follow up in 6 weeks or so on wellbutrin.           COUNSELING:  Reviewed preventive health counseling, as reflected in patient instructions        "Regular exercise       Healthy diet/nutrition      BMI:   Estimated body mass index is 26.5 kg/m  as calculated from the following:    Height as of this encounter: 1.6 m (5' 3\").    Weight as of this encounter: 67.9 kg (149 lb 9.6 oz).   Weight management plan: Discussed healthy diet and exercise guidelines      She reports that she has been smoking cigarettes. She has a 5.00 pack-year smoking history. She has quit using smokeless tobacco.  Her smokeless tobacco use included chew.  Nicotine/Tobacco Cessation Plan:   Pharmacotherapies : bupropion (Zyban)          Hemant Whelan MD  [facility]  Answers for HPI/ROS submitted by the patient on 2/2/2023  If you checked off any problems, how difficult have these problems made it for you to do your work, take care of things at home, or get along with other people?: Somewhat difficult  PHQ9 TOTAL SCORE: 11  LISA 7 TOTAL SCORE: 10      "

## 2023-02-03 LAB — HCV AB SERPL QL IA: NONREACTIVE

## 2023-02-06 LAB
BKR LAB AP GYN ADEQUACY: NORMAL
BKR LAB AP GYN INTERPRETATION: NORMAL
BKR LAB AP HPV REFLEX: NORMAL
BKR LAB AP LMP: NORMAL
BKR LAB AP PREVIOUS ABNORMAL: NORMAL
PATH REPORT.COMMENTS IMP SPEC: NORMAL
PATH REPORT.COMMENTS IMP SPEC: NORMAL
PATH REPORT.RELEVANT HX SPEC: NORMAL

## 2023-02-07 LAB
HUMAN PAPILLOMA VIRUS 16 DNA: NEGATIVE
HUMAN PAPILLOMA VIRUS 18 DNA: NEGATIVE
HUMAN PAPILLOMA VIRUS FINAL DIAGNOSIS: NORMAL
HUMAN PAPILLOMA VIRUS OTHER HR: NEGATIVE

## 2023-02-16 ENCOUNTER — E-VISIT (OUTPATIENT)
Dept: FAMILY MEDICINE | Facility: OTHER | Age: 33
End: 2023-02-16
Attending: PHYSICIAN ASSISTANT
Payer: COMMERCIAL

## 2023-02-16 DIAGNOSIS — R11.10 VOMITING, UNSPECIFIED VOMITING TYPE, UNSPECIFIED WHETHER NAUSEA PRESENT: Primary | ICD-10-CM

## 2023-02-16 NOTE — PATIENT INSTRUCTIONS
Thank you for choosing us for your care. I think an in-clinic visit would be best next steps based on your symptoms. Please schedule a clinic appointment; you won t be charged for this eVisit.      You can schedule an appointment right here in [facility], or call [phone]    Thank you for choosing us for your care. I think an in-clinic visit would be best next steps based on your symptoms. Please schedule a clinic appointment; you won t be charged for this eVisit.      You can schedule an appointment right here in [facility], or call [phone]

## 2023-03-17 ENCOUNTER — OFFICE VISIT (OUTPATIENT)
Dept: OBGYN | Facility: OTHER | Age: 33
End: 2023-03-17
Attending: OBSTETRICS & GYNECOLOGY
Payer: COMMERCIAL

## 2023-03-17 VITALS
WEIGHT: 148 LBS | DIASTOLIC BLOOD PRESSURE: 56 MMHG | SYSTOLIC BLOOD PRESSURE: 102 MMHG | BODY MASS INDEX: 26.22 KG/M2 | HEART RATE: 80 BPM | HEIGHT: 63 IN

## 2023-03-17 DIAGNOSIS — N96 RECURRENT PREGNANCY LOSS: ICD-10-CM

## 2023-03-17 DIAGNOSIS — N97.9 SECONDARY FEMALE INFERTILITY: ICD-10-CM

## 2023-03-17 DIAGNOSIS — N94.6 DYSMENORRHEA: Primary | ICD-10-CM

## 2023-03-17 LAB
PROLACTIN SERPL 3RD IS-MCNC: 25 NG/ML (ref 5–23)
TSH SERPL DL<=0.005 MIU/L-ACNC: 1.81 UIU/ML (ref 0.3–4.2)

## 2023-03-17 PROCEDURE — 86146 BETA-2 GLYCOPROTEIN ANTIBODY: CPT | Mod: ZL | Performed by: OBSTETRICS & GYNECOLOGY

## 2023-03-17 PROCEDURE — 99203 OFFICE O/P NEW LOW 30 MIN: CPT | Performed by: OBSTETRICS & GYNECOLOGY

## 2023-03-17 PROCEDURE — 84443 ASSAY THYROID STIM HORMONE: CPT | Mod: ZL | Performed by: OBSTETRICS & GYNECOLOGY

## 2023-03-17 PROCEDURE — 85730 THROMBOPLASTIN TIME PARTIAL: CPT | Mod: ZL | Performed by: OBSTETRICS & GYNECOLOGY

## 2023-03-17 PROCEDURE — G0463 HOSPITAL OUTPT CLINIC VISIT: HCPCS

## 2023-03-17 PROCEDURE — 84146 ASSAY OF PROLACTIN: CPT | Mod: ZL | Performed by: OBSTETRICS & GYNECOLOGY

## 2023-03-17 PROCEDURE — 85390 FIBRINOLYSINS SCREEN I&R: CPT | Mod: 26 | Performed by: PATHOLOGY

## 2023-03-17 PROCEDURE — 83520 IMMUNOASSAY QUANT NOS NONAB: CPT | Mod: ZL | Performed by: OBSTETRICS & GYNECOLOGY

## 2023-03-17 PROCEDURE — 86147 CARDIOLIPIN ANTIBODY EA IG: CPT | Mod: ZL | Performed by: OBSTETRICS & GYNECOLOGY

## 2023-03-17 PROCEDURE — 36415 COLL VENOUS BLD VENIPUNCTURE: CPT | Mod: ZL | Performed by: OBSTETRICS & GYNECOLOGY

## 2023-03-17 RX ORDER — POLYETHYLENE GLYCOL 3350 17 G/17G
1 POWDER, FOR SOLUTION ORAL DAILY
COMMUNITY

## 2023-03-17 ASSESSMENT — PAIN SCALES - GENERAL: PAINLEVEL: MODERATE PAIN (4)

## 2023-03-17 NOTE — PROGRESS NOTES
Gynecology Visit    CC: infertility    HPI:    Sue De La Fuente is a 32 year old , here for the above concern. She reports not preventing conception with her current partner x4 years but actively trying for at least the past 1-2 years. She has monthly menses, lasting 4-5 days, with moderate flow. She has cramping on day 2 but not prior to menses.     Her pregnancy history is significant for 5 spontaneous miscarriages between 8-14 weeks, one of which required D&C. She has not had an evaluation for recurrent pregnancy loss. Her son was born after spontaneous  labor that initially started at 27 weeks, ultimately delivered at 31 weeks. This was a different partner.    Her current partner is Raymundo, age 34, together x4 years. He presents with her today for the visit. He has not had any children, nor has he tried. +THC and tobacco use. No hot tub use, >6 months since last sauna use, no chemical exposure.     OBHx  OB History    Para Term  AB Living   6 1 0 1 5 1   SAB IAB Ectopic Multiple Live Births   5 0 0 0 1      # Outcome Date GA Lbr Ki/2nd Weight Sex Delivery Anes PTL Lv   6  10/24/15 31w0d   M Vag-Spont  Y JENNIFER      Apgar1: 6  Apgar5: 6   5 SAB            4 SAB            3 SAB            2 SAB            1 SAB                PMHx:   Past Medical History:   Diagnosis Date     Headache     3/28/2012      PSHx:   Past Surgical History:   Procedure Laterality Date     OTHER SURGICAL HISTORY      ,,HERNIA REPAIR     OTHER SURGICAL HISTORY      11/17/15,,HERNIA REPAIR,[facility] with mesh underlay      Meds:   Current Outpatient Medications   Medication     buPROPion (WELLBUTRIN XL) 150 MG 24 hr tablet     polyethylene glycol (MIRALAX) 17 g packet     No current facility-administered medications for this visit.      Allergies:       Allergies   Allergen Reactions     Sulfa Drugs Anaphylaxis     Cats      Dogs      Sumatriptan      Side effects from sumatriptan compounded by  "using it with her Celexa and Lamictal which may increase blood levels and serotonin effect.  Stay off the sumatriptin at least until she talks to her provider. If they want to use it again, may have to consider a lower dose.        SocHx:   Social History     Tobacco Use     Smoking status: Every Day     Packs/day: 0.50     Years: 10.00     Pack years: 5.00     Types: Cigarettes     Smokeless tobacco: Former     Types: Chew   Vaping Use     Vaping Use: Never used   Substance Use Topics     Alcohol use: Yes     Alcohol/week: 5.0 standard drinks     Types: 5 Cans of beer per week     Drug use: No       FamHx: Reviewed, noncontributory     Physical Exam  /56 (BP Location: Right arm, Patient Position: Sitting, Cuff Size: Adult Regular)   Pulse 80   Ht 1.6 m (5' 3\")   Wt 67.1 kg (148 lb)   LMP 2023 (Exact Date)   BMI 26.22 kg/m    Gen: Well-appearing, NAD  Resp: nonlabored  Psych: appropriate mood and affect      Assessment/Plan  Sue De La Fuente is a 32 year old  female here for secondary infertility, also with a history of recurrent pregnancy loss. Recommend starting lab evaluation, including antiphospholipid antibody testing, pelvic ultrasound, HSG, and semen analysis. Will have them see financial advocates today as well. They are in agreement with this plan. Will have them follow up after completion of testing to review results and make a fertility treatment plan.     Kavya Nova MD  OB/GYN  "

## 2023-03-17 NOTE — NURSING NOTE
Chief Complaint   Patient presents with     Consult     Fertility Concerns        Medication Reconciliation: complete       Tania Thacker LPN........................3/17/2023  2:31 PM

## 2023-03-18 LAB — MIS SERPL-MCNC: 2.11 NG/ML (ref 0.58–8.1)

## 2023-03-20 LAB
B2 GLYCOPROT1 IGG SERPL IA-ACNC: 2.5 U/ML
B2 GLYCOPROT1 IGM SERPL IA-ACNC: <2.4 U/ML
CARDIOLIPIN IGG SER IA-ACNC: <2 GPL-U/ML
CARDIOLIPIN IGG SER IA-ACNC: NEGATIVE
CARDIOLIPIN IGM SER IA-ACNC: <2 MPL-U/ML
CARDIOLIPIN IGM SER IA-ACNC: NEGATIVE
DRVVT SCREEN RATIO: 0.95
INR PPP: 1 (ref 0.85–1.15)
LA PPP-IMP: NEGATIVE
LUPUS INTERPRETATION: NORMAL
PTT RATIO: 1.05
THROMBIN TIME: 16.4 SECONDS (ref 13–19)

## 2023-03-31 ENCOUNTER — TELEPHONE (OUTPATIENT)
Dept: LAB | Facility: OTHER | Age: 33
End: 2023-03-31
Payer: COMMERCIAL

## 2023-03-31 DIAGNOSIS — Z01.812 PRE-PROCEDURE LAB EXAM: Primary | ICD-10-CM

## 2023-04-07 ENCOUNTER — OFFICE VISIT (OUTPATIENT)
Dept: INTERNAL MEDICINE | Facility: OTHER | Age: 33
End: 2023-04-07
Attending: NURSE PRACTITIONER
Payer: COMMERCIAL

## 2023-04-07 ENCOUNTER — HOSPITAL ENCOUNTER (OUTPATIENT)
Dept: GENERAL RADIOLOGY | Facility: OTHER | Age: 33
Discharge: HOME | End: 2023-04-07
Attending: NURSE PRACTITIONER
Payer: COMMERCIAL

## 2023-04-07 VITALS
OXYGEN SATURATION: 97 % | BODY MASS INDEX: 26.36 KG/M2 | RESPIRATION RATE: 14 BRPM | HEART RATE: 78 BPM | SYSTOLIC BLOOD PRESSURE: 118 MMHG | TEMPERATURE: 97.6 F | WEIGHT: 148.8 LBS | DIASTOLIC BLOOD PRESSURE: 68 MMHG

## 2023-04-07 DIAGNOSIS — M25.562 LEFT KNEE PAIN, UNSPECIFIED CHRONICITY: Primary | ICD-10-CM

## 2023-04-07 DIAGNOSIS — M25.562 LEFT KNEE PAIN, UNSPECIFIED CHRONICITY: ICD-10-CM

## 2023-04-07 PROCEDURE — G0463 HOSPITAL OUTPT CLINIC VISIT: HCPCS | Mod: 25

## 2023-04-07 PROCEDURE — 99204 OFFICE O/P NEW MOD 45 MIN: CPT | Performed by: NURSE PRACTITIONER

## 2023-04-07 PROCEDURE — G0463 HOSPITAL OUTPT CLINIC VISIT: HCPCS

## 2023-04-07 PROCEDURE — 73562 X-RAY EXAM OF KNEE 3: CPT | Mod: LT

## 2023-04-07 PROCEDURE — 250N000011 HC RX IP 250 OP 636: Performed by: NURSE PRACTITIONER

## 2023-04-07 PROCEDURE — 96372 THER/PROPH/DIAG INJ SC/IM: CPT | Performed by: NURSE PRACTITIONER

## 2023-04-07 RX ORDER — KETOROLAC TROMETHAMINE 10 MG/1
10 TABLET, FILM COATED ORAL EVERY 6 HOURS PRN
Qty: 20 TABLET | Refills: 0 | Status: SHIPPED | OUTPATIENT
Start: 2023-04-07 | End: 2023-06-13

## 2023-04-07 RX ORDER — KETOROLAC TROMETHAMINE 30 MG/ML
60 INJECTION, SOLUTION INTRAMUSCULAR; INTRAVENOUS ONCE
Status: COMPLETED | OUTPATIENT
Start: 2023-04-07 | End: 2023-04-07

## 2023-04-07 RX ORDER — AMOXICILLIN 500 MG/1
CAPSULE ORAL
COMMUNITY
Start: 2023-03-24 | End: 2023-04-18

## 2023-04-07 RX ADMIN — KETOROLAC TROMETHAMINE 60 MG: 30 INJECTION, SOLUTION INTRAMUSCULAR at 16:45

## 2023-04-07 ASSESSMENT — PATIENT HEALTH QUESTIONNAIRE - PHQ9
SUM OF ALL RESPONSES TO PHQ QUESTIONS 1-9: 4
SUM OF ALL RESPONSES TO PHQ QUESTIONS 1-9: 4
10. IF YOU CHECKED OFF ANY PROBLEMS, HOW DIFFICULT HAVE THESE PROBLEMS MADE IT FOR YOU TO DO YOUR WORK, TAKE CARE OF THINGS AT HOME, OR GET ALONG WITH OTHER PEOPLE: SOMEWHAT DIFFICULT

## 2023-04-07 ASSESSMENT — PAIN SCALES - GENERAL: PAINLEVEL: SEVERE PAIN (7)

## 2023-04-07 NOTE — PROGRESS NOTES
Assessment & Plan     Left knee pain, unspecified chronicity  We will obtain imaging today. If Xray is negative, will order MRI for soft tissue evaluation. We will put in her a sleeve today for pain/swelling. We will give her a shot of toradol for pain today and give her a Rx to use at home. Discussed minimal use and to drink plenty of water as it is hard on the kidneys.  - XR Knee Left 3 Views, negative  - MR Knee Left w/o Contrast, ordered for future  - ketorolac (TORADOL) injection 60 mg, given in clinic today for pain, inflammation  - ketorolac (TORADOL) 10 MG tablet  Dispense: 20 tablet; Refill: 0  - Knee Supplies Order Knee Sleeve/Brace; Left; Open    Review of the result(s) of each unique test - imaging  Ordering of each unique test  Prescription drug management     Return if symptoms worsen or fail to improve.    Olga Lidia Charles NP  [facility]   Sue is a 32 year old, presenting for the following health issues:Musculoskeletal Problem (L knee injury)     4/7/2023   3:49 PM   Additional Questions   Roomed by Silvana TOLBERT LPN     Musculoskeletal Problem    History of Present Illness       Reason for visit:  Knee pain/burning  Symptom onset:  3-4 weeks ago  Symptoms include:  Swelling  Symptom intensity:  Severe  Symptom progression:  Worsening  Had these symptoms before:  Yes  Has tried/received treatment for these symptoms:  No  What makes it worse:  Walking kneeling weight bearing  What makes it better:  Ice when using    She eats 0-1 servings of fruits and vegetables daily.She consumes 1 sweetened beverage(s) daily.She exercises with enough effort to increase her heart rate 60 or more minutes per day.  She exercises with enough effort to increase her heart rate 5 days per week. She is missing 1 dose(s) of medications per week.    Today's PHQ-9         PHQ-9 Total Score: 4    PHQ-9 Q9 Thoughts of better off dead/self-harm past 2 weeks :   Not at all    How difficult  have these problems made it for you to do your work, take care of things at home, or get along with other people: Somewhat difficult     On fire when she is squatting, bending, stabbing pains in medial aspect of knee/beside kneecap.  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  Initial injury was when she fell on concrete.  Mechanism of fall is uncertain as she reports it happened so fast she does not know if her knee went off to the side or not.    Review of Systems   CONSTITUTIONAL: NEGATIVE for fever, chills, change in weight  ENT/MOUTH: NEGATIVE for ear, mouth and throat problems  RESP: NEGATIVE for significant cough or SOB  CV: NEGATIVE for chest pain, palpitations or peripheral edema  MUSCULOSKELETAL: POSITIVE  for injury to left knee, joint pain of left knee at medial aspect of knee cap, joint stiffness left knee and joint swelling left knee  ROS otherwise negative          Objective    /68 (BP Location: Right arm, Patient Position: Sitting, Cuff Size: Adult Regular)   Pulse 78   Temp 97.6  F (36.4  C) (Temporal)   Resp 14   Wt 67.5 kg (148 lb 12.8 oz)   LMP 04/03/2023   SpO2 97%   BMI 26.36 kg/m    Body mass index is 26.36 kg/m .  Physical Exam   GENERAL: healthy, alert and no distress  EYES: Eyes grossly normal to inspection, PERRL and conjunctivae and sclerae normal  RESP: lungs clear to auscultation - no rales, rhonchi or wheezes  CV: regular rate and rhythm, normal S1 S2, no S3 or S4, no murmur, click or rub, no peripheral edema and peripheral pulses strong  ABDOMEN: soft, nontender, no hepatosplenomegaly, no masses and bowel sounds normal  MS: decreased range of motion in left knee, swelling in medial aspect just above and to the side of her patella and tenderness to palpation   NEURO: Normal strength and tone, mentation intact and speech normal  PSYCH: mentation appears normal, affect normal/bright    Results for orders placed or performed during the hospital encounter of  04/07/23   XR Knee Left 3 Views     Status: None    Narrative    PROCEDURE:  XR KNEE LEFT 3 VIEWS    HISTORY: Left knee pain, unspecified chronicity    COMPARISON:  None.    TECHNIQUE:  3 views of the left knee were obtained.    FINDINGS:  No fracture or dislocation is identified. The joint spaces  are preserved.        Impression    IMPRESSION: Normal left knee      MAUREEN PAIGE MD         SYSTEM ID:  [id number]

## 2023-04-07 NOTE — NURSING NOTE
"Chief Complaint   Patient presents with     Musculoskeletal Problem     L knee injury       Initial /68 (BP Location: Right arm, Patient Position: Sitting, Cuff Size: Adult Regular)   Pulse 78   Temp 97.6  F (36.4  C) (Temporal)   Resp 14   Wt 67.5 kg (148 lb 12.8 oz)   LMP 04/03/2023   SpO2 97%   BMI 26.36 kg/m   Estimated body mass index is 26.36 kg/m  as calculated from the following:    Height as of 3/17/23: 1.6 m (5' 3\").    Weight as of this encounter: 67.5 kg (148 lb 12.8 oz).     Medication Reconciliation: complete      FOOD SECURITY SCREENING QUESTIONS:    The next two questions are to help us understand your food security.  If you are feeling you need any assistance in this area, we have resources available to support you today.    Hunger Vital Signs:  Within the past 12 months we worried whether our food would run out before we got money to buy more. Never  Within the past 12 months the food we bought just didn't last and we didn't have money to get more. Never        Advance care plan reviewed      Silvana Kirk LPN on 4/7/2023 at 4:04 PM      "

## 2023-04-12 ENCOUNTER — LAB (OUTPATIENT)
Dept: LAB | Facility: OTHER | Age: 33
End: 2023-04-12
Attending: OBSTETRICS & GYNECOLOGY
Payer: COMMERCIAL

## 2023-04-12 ENCOUNTER — HOSPITAL ENCOUNTER (OUTPATIENT)
Dept: ULTRASOUND IMAGING | Facility: OTHER | Age: 33
Discharge: HOME | End: 2023-04-12
Attending: OBSTETRICS & GYNECOLOGY
Payer: COMMERCIAL

## 2023-04-12 ENCOUNTER — HOSPITAL ENCOUNTER (OUTPATIENT)
Dept: GENERAL RADIOLOGY | Facility: OTHER | Age: 33
Discharge: HOME | End: 2023-04-12
Attending: OBSTETRICS & GYNECOLOGY
Payer: COMMERCIAL

## 2023-04-12 ENCOUNTER — OFFICE VISIT (OUTPATIENT)
Dept: OBGYN | Facility: OTHER | Age: 33
End: 2023-04-12
Attending: STUDENT IN AN ORGANIZED HEALTH CARE EDUCATION/TRAINING PROGRAM
Payer: COMMERCIAL

## 2023-04-12 DIAGNOSIS — N94.6 DYSMENORRHEA: ICD-10-CM

## 2023-04-12 DIAGNOSIS — Z01.812 PRE-PROCEDURE LAB EXAM: ICD-10-CM

## 2023-04-12 DIAGNOSIS — N97.9 SECONDARY FEMALE INFERTILITY: ICD-10-CM

## 2023-04-12 DIAGNOSIS — N97.9 FEMALE INFERTILITY: Primary | ICD-10-CM

## 2023-04-12 LAB — HCG UR QL: NEGATIVE

## 2023-04-12 PROCEDURE — 58340 CATHETER FOR HYSTEROGRAPHY: CPT | Performed by: STUDENT IN AN ORGANIZED HEALTH CARE EDUCATION/TRAINING PROGRAM

## 2023-04-12 PROCEDURE — 76856 US EXAM PELVIC COMPLETE: CPT

## 2023-04-12 PROCEDURE — 81025 URINE PREGNANCY TEST: CPT | Mod: ZL

## 2023-04-12 PROCEDURE — 74740 X-RAY FEMALE GENITAL TRACT: CPT

## 2023-04-12 NOTE — PROGRESS NOTES
Procedure: Hysterosalpingogram (HSG) Contrast Injection    Name:  Sue De La Fuente  MRN:  [id number]  :  1990      Preoperative Diagnosis:  Unexplained infertility, history of recurrent miscarriages  Postoperative Diagnosis:  Same  UPT:  Negative  :  SOO PARIKH MD  Complications:  None    Procedure Description:  Sue was placed in the dorsolithotomy position and speculum was gently inserted to clearly visualize the cervix.  The cervix was cleaned with iodine solution three times.  The cervix was grasped with a single toothed tenaculum at the 12 o'clock position.  The HSG cannula was attached to a 30-cc syringe containing water-soluble contrast and the cannula was primed to ensure all air has been removed from the tubing tract. The tip of the cannula was then introduced into the external cervical os and gently advanced to allow the catheter balloon to be filled.  Contrast was injected to image the uterine cavity and fallopian tubes under fluoroscopic monitoring.    Dr. Centeno was present during the procedure to provide instant feedback.    Findings: my preliminary findings-- formal read to follow by Dr. Centeno  Uterine cavity:  Normal cavity   Fallopian tubes: Bilateral dye spilling noted     Sue tolerated the procedure well.     Answers for HPI/ROS submitted by the patient on 2023  How many servings of fruits and vegetables do you eat daily?: 0-1  On average, how many sweetened beverages do you drink each day (Examples: soda, juice, sweet tea, etc.  Do NOT count diet or artificially sweetened beverages)?: 1  How many minutes a day do you exercise enough to make your heart beat faster?: 60 or more  How many days a week do you exercise enough to make your heart beat faster?: 5  How many days per week do you miss taking your medication?: 1  What is the reason for your visit today?: knee pain/burning  When did your symptoms begin?: 3-4 weeks ago  What are your symptoms?: swelling  How  would you describe these symptoms?: Severe  Are your symptoms:: Worsening  Have you had these symptoms before?: Yes  Have you tried or received treatment for these symptoms before?: No  Is there anything that makes you feel worse?: walking kneeling weight bearing  Is there anything that makes you feel better?: ice when using

## 2023-04-15 ENCOUNTER — HOSPITAL ENCOUNTER (OUTPATIENT)
Dept: MRI IMAGING | Facility: OTHER | Age: 33
Discharge: HOME | End: 2023-04-15
Attending: NURSE PRACTITIONER | Admitting: NURSE PRACTITIONER
Payer: COMMERCIAL

## 2023-04-15 DIAGNOSIS — M25.562 LEFT KNEE PAIN, UNSPECIFIED CHRONICITY: ICD-10-CM

## 2023-04-15 PROCEDURE — 73721 MRI JNT OF LWR EXTRE W/O DYE: CPT | Mod: LT

## 2023-04-17 ENCOUNTER — E-VISIT (OUTPATIENT)
Dept: INTERNAL MEDICINE | Facility: OTHER | Age: 33
End: 2023-04-17
Payer: COMMERCIAL

## 2023-04-17 DIAGNOSIS — M25.562 LEFT KNEE PAIN, UNSPECIFIED CHRONICITY: Primary | ICD-10-CM

## 2023-04-18 ENCOUNTER — TELEPHONE (OUTPATIENT)
Dept: FAMILY MEDICINE | Facility: OTHER | Age: 33
End: 2023-04-18
Payer: COMMERCIAL

## 2023-04-18 ENCOUNTER — OFFICE VISIT (OUTPATIENT)
Dept: OBGYN | Facility: OTHER | Age: 33
End: 2023-04-18
Attending: OBSTETRICS & GYNECOLOGY
Payer: COMMERCIAL

## 2023-04-18 VITALS
DIASTOLIC BLOOD PRESSURE: 72 MMHG | BODY MASS INDEX: 26.22 KG/M2 | SYSTOLIC BLOOD PRESSURE: 116 MMHG | WEIGHT: 148 LBS | HEART RATE: 72 BPM

## 2023-04-18 DIAGNOSIS — M25.562 ACUTE PAIN OF LEFT KNEE: Primary | ICD-10-CM

## 2023-04-18 DIAGNOSIS — N97.9 SECONDARY FEMALE INFERTILITY: Primary | ICD-10-CM

## 2023-04-18 PROCEDURE — G0463 HOSPITAL OUTPT CLINIC VISIT: HCPCS

## 2023-04-18 PROCEDURE — 99213 OFFICE O/P EST LOW 20 MIN: CPT | Performed by: OBSTETRICS & GYNECOLOGY

## 2023-04-18 ASSESSMENT — PATIENT HEALTH QUESTIONNAIRE - PHQ9
SUM OF ALL RESPONSES TO PHQ QUESTIONS 1-9: 3
10. IF YOU CHECKED OFF ANY PROBLEMS, HOW DIFFICULT HAVE THESE PROBLEMS MADE IT FOR YOU TO DO YOUR WORK, TAKE CARE OF THINGS AT HOME, OR GET ALONG WITH OTHER PEOPLE: SOMEWHAT DIFFICULT
SUM OF ALL RESPONSES TO PHQ QUESTIONS 1-9: 3

## 2023-04-18 ASSESSMENT — PAIN SCALES - GENERAL: PAINLEVEL: MODERATE PAIN (5)

## 2023-04-18 NOTE — TELEPHONE ENCOUNTER
Provider E-Visit time total (minutes): zero, e-visit not necessary. Cortney Stevens RN on 4/18/2023 at 9:08 AM

## 2023-04-18 NOTE — PROGRESS NOTES
Follow-Up Visit    S: Ms. Sue De La Fuente is a 32 year old  here for secondary infertility and recurrent pregnancy loss follow up. She is with her boyfriend, Raymundo.     O:  /72 (BP Location: Right arm, Patient Position: Sitting, Cuff Size: Adult Regular)   Pulse 72   Wt 67.1 kg (148 lb)   LMP 2023   BMI 26.22 kg/m    Gen: Well-appearing, NAD  Pulm: nonlabored  Psych: appropriate mood and affect    Component      Latest Ref Rng 3/17/2023  3:03 PM   INR      0.85 - 1.15  1.00    Thrombin Time      13.0 - 19.0 Seconds 16.4    PTT Ratio      <1.21  1.05    DRVVT Screen Ratio      <1.08  0.95    Lupus Result      Negative  Negative    Lupus Interpretation The INR is normal.     Cardiolipin Meagan IgG Instrument Value      <10.0 GPL-U/mL <2.0    Cardiolipin IgG Meagan      Negative  Negative    Cardiolipin Meagan IgM Instrument Value      <10.0 MPL-U/mL <2.0    Cardiolipin IgM Maegan      Negative  Negative    Beta 2 Glycoprotein 1 Antibody IgG      <7.0 U/mL 2.5    Beta 2 Glycoprotein 1 Antibody IgM      <7.0 U/mL <2.4    TSH      0.30 - 4.20 uIU/mL 1.81    Anti-Mullerian Hormone      0.580 - 8.100 ng/mL 2.110    Prolactin      5 - 23 ng/mL 25 (H)      A/P:  Ms. Sue De La Fuente is a 32 year old  here for secondary infertility follow up. Reviewed essentially normal hormonal testing and APAS testing- prolactin was not drawn in the AM. She also had a normal ultrasound and HSG with bilaterally patent tubes. Discussed her boyfriend's semen analysis demonstrating low volume (0.6 mL) and low count (5.5 mil/mL) with normal motility (75%). Recommend referral to urology. Discussed fertility options at [facility]- ovulation induction + IUI, vs referral if IVF services would be indicated. Their questions were answered and will follow up after his evaluation is completed.    Kavya Nova MD  OB/GYN  2023 2:06 PM

## 2023-04-18 NOTE — TELEPHONE ENCOUNTER
Patient requests a referral to Ortho for anyone to address, PCP out. Cortney Stevens RN on 4/18/2023 at 9:10 AM

## 2023-04-18 NOTE — NURSING NOTE
Chief Complaint   Patient presents with     Follow Up     Fertility        Medication Reconciliation: complete       Tania Thacker LPN........................4/18/2023  1:59 PM

## 2023-04-27 ENCOUNTER — OFFICE VISIT (OUTPATIENT)
Dept: FAMILY MEDICINE | Facility: OTHER | Age: 33
End: 2023-04-27
Attending: FAMILY MEDICINE
Payer: COMMERCIAL

## 2023-04-27 VITALS
WEIGHT: 147.4 LBS | TEMPERATURE: 97.3 F | RESPIRATION RATE: 16 BRPM | OXYGEN SATURATION: 98 % | HEART RATE: 88 BPM | SYSTOLIC BLOOD PRESSURE: 112 MMHG | BODY MASS INDEX: 26.11 KG/M2 | DIASTOLIC BLOOD PRESSURE: 68 MMHG

## 2023-04-27 DIAGNOSIS — Q68.2 PATELLA ALTA: ICD-10-CM

## 2023-04-27 DIAGNOSIS — M62.81 QUADRICEPS WEAKNESS: ICD-10-CM
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
## 2023-04-27 DIAGNOSIS — M22.2X2 PATELLOFEMORAL PAIN SYNDROME OF LEFT KNEE: Primary | ICD-10-CM

## 2023-04-27 PROCEDURE — G0463 HOSPITAL OUTPT CLINIC VISIT: HCPCS

## 2023-04-27 PROCEDURE — 99214 OFFICE O/P EST MOD 30 MIN: CPT | Performed by: FAMILY MEDICINE

## 2023-04-27 ASSESSMENT — PAIN SCALES - GENERAL: PAINLEVEL: EXTREME PAIN (8)

## 2023-04-27 NOTE — PROGRESS NOTES
Sports Medicine Office Note    HPI:  32-year-old female coming in for evaluation of left knee pain.  She slipped and fell on ice 2-3 months ago.  She landed on the anterior aspect of her knee.  Since that time she has had pain in the anterior part of the knee.  She has been evaluated in primary care clinic prior to this visit.  An MRI was ordered.  Her current pain is 5-8/10.  She characterizes the pain as sharp, stabbing, aching, throbbing, and burning.  She has difficulty with lifting, squatting, running, and going up stairs.  She has tried heat, ice, OTC medications, a patella buttress brace, and Toradol.  She is getting minimal improvement with these interventions.  She does feel like she has some associated swelling.  No prior trauma/surgeries.      EXAM:  /68 (BP Location: Right arm, Patient Position: Sitting, Cuff Size: Adult Regular)   Pulse 88   Temp 97.3  F (36.3  C) (Temporal)   Resp 16   Wt 66.9 kg (147 lb 6.4 oz)   LMP 04/03/2023   SpO2 98%   BMI 26.11 kg/m    MUSCULOSKELETAL EXAM:  LEFT KNEE  Inspection:  -No gross deformity  -No bruising or soft tissue swelling  -Scars:  None    Tenderness:  - Diffuse tenderness throughout the joint, most prominent in the peripatellar region    Range of Motion:  -Passive flexion:  135 with pain at endrange  -Passive extension:  0    Strength:  -Extension:  4/5  -Flexion:  4/5    Special Tests:  -Effusion: Minimal  -Medial patellar glide:  Negative  -Lateral patellar glide:  Negative  -Patellar apprehension:  Negative  -Medial Sabra's: Positive  -Lateral Sabra's:  Negative  -Valgus stress:  Negative  -Varus stress:  Negative  -Lachman test:  Negative  -Anterior drawer:  Negative  -Posterior drawer:  Negative    Other:  -Intact sensation to light touch distally.  -No signs of cyanosis. Normal skin temperature of the lower extremity.  -Foot/ankle:  No gross deformity. Full range of motion.  -Right knee:  No gross deformity. No palpable tenderness.  Normal strength and ROM.      IMAGIN23: 3 view left knee x-ray  - No fracture, dislocation, or bony lesion.  Patella alto with an Insall-Salvati ratio of 1.35.    4/15/2023: MRI left knee  - No bony edema or fracture.  Menisci appear normal.  Normal-appearing ACL, PCL, MCL, and LCL.  Partial thinning of the articular cartilage in the medial tibiofemoral compartment.  Shallow trochlear groove.  TT-TG measures approximately 1 cm.      ASSESSMENT/PLAN:  Diagnoses and all orders for this visit:  Patellofemoral pain syndrome of left knee  -     Orthopedic  Referral  -     Physical Therapy Referral; Future  Quadriceps weakness  -     Physical Therapy Referral; Future  Patella marsha  Fall due to slipping on ice or snow, initial encounter    32-year-old female with an acute left knee injury which seems to have precipitated the onset of patellofemoral pain symptoms.  X-rays from  and MRI from 4/15 were both personally reviewed in the office with the findings as demonstrated above by my interpretation.  Radiologist interpretation of the MRI is also noted.  She does have some findings on MRI that are more suggestive of chronic/degenerative pathology.  No findings to suggest acute contusion/structural compromise.  She does have a shallow trochlear groove, patella alto, and a borderline elevated TT-TG measurement.  Given the reassuring findings on physical exam and MRI, I feel that a lot of her pain is biomechanical and that she would benefit from interventions to address this.  - Handout given with home exercises for patellofemoral pain syndrome  - Demonstrated VMO strengthening exercise in the office  - Referral placed to physical therapy  - If symptoms or not improving with the above interventions, we could consider a one-time trial of a CSI though I would like her to have completed a robust course of therapy before attempting this      Toño Garcia MD  2023  9:31 AM    Total time spent with this  patient was 37 minutes which included chart review, visualization and independent interpretation of images, time spent with the patient, and documentation.    Procedure time:  0 minute(s)

## 2023-07-28 DIAGNOSIS — F33.0 MILD RECURRENT MAJOR DEPRESSION (H): Primary | ICD-10-CM

## 2023-07-28 RX ORDER — BUPROPION HYDROCHLORIDE 300 MG/1
300 TABLET ORAL EVERY MORNING
Qty: 90 TABLET | Refills: 4 | Status: SHIPPED | OUTPATIENT
Start: 2023-07-28

## 2023-10-10 ENCOUNTER — MYC MEDICAL ADVICE (OUTPATIENT)
Dept: FAMILY MEDICINE | Facility: OTHER | Age: 33
End: 2023-10-10
Payer: COMMERCIAL

## 2023-10-10 ENCOUNTER — E-VISIT (OUTPATIENT)
Dept: FAMILY MEDICINE | Facility: OTHER | Age: 33
End: 2023-10-10
Payer: COMMERCIAL

## 2023-10-10 DIAGNOSIS — R50.9 FEVER, UNSPECIFIED: ICD-10-CM

## 2023-10-10 DIAGNOSIS — R06.02 SHORTNESS OF BREATH: Primary | ICD-10-CM

## 2023-10-10 NOTE — PATIENT INSTRUCTIONS
Dear Sue De aL Fuente,    We are sorry you are not feeling well. Based on the responses you provided, it is recommended that you be seen in-person in urgent care so we can better evaluate your symptoms. Please click here to find the nearest urgent care location to you.   You will not be charged for this Visit. Thank you for trusting us with your care.    Laura Coronel PA-C

## 2023-10-20 ENCOUNTER — OFFICE VISIT (OUTPATIENT)
Dept: OBGYN | Facility: OTHER | Age: 33
End: 2023-10-20
Attending: OBSTETRICS & GYNECOLOGY
Payer: COMMERCIAL

## 2023-10-20 VITALS
HEART RATE: 76 BPM | DIASTOLIC BLOOD PRESSURE: 68 MMHG | BODY MASS INDEX: 26.57 KG/M2 | SYSTOLIC BLOOD PRESSURE: 102 MMHG | WEIGHT: 150 LBS

## 2023-10-20 DIAGNOSIS — N97.9 SECONDARY FEMALE INFERTILITY: Primary | ICD-10-CM

## 2023-10-20 PROCEDURE — G0463 HOSPITAL OUTPT CLINIC VISIT: HCPCS | Performed by: OBSTETRICS & GYNECOLOGY

## 2023-10-20 PROCEDURE — 99213 OFFICE O/P EST LOW 20 MIN: CPT | Performed by: OBSTETRICS & GYNECOLOGY

## 2023-10-20 ASSESSMENT — PATIENT HEALTH QUESTIONNAIRE - PHQ9
SUM OF ALL RESPONSES TO PHQ QUESTIONS 1-9: 6
SUM OF ALL RESPONSES TO PHQ QUESTIONS 1-9: 6
10. IF YOU CHECKED OFF ANY PROBLEMS, HOW DIFFICULT HAVE THESE PROBLEMS MADE IT FOR YOU TO DO YOUR WORK, TAKE CARE OF THINGS AT HOME, OR GET ALONG WITH OTHER PEOPLE: SOMEWHAT DIFFICULT

## 2023-10-20 ASSESSMENT — PAIN SCALES - GENERAL: PAINLEVEL: NO PAIN (0)

## 2023-10-20 NOTE — PROGRESS NOTES
Follow-Up Visit    S: Ms. 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 Since last visit, her  was seen by a urologist and told he has low sperm count due to the surgery he had as a child. Reports the quality of his sperm is normal and he was told he would have good chances of success with either IUI or IVF reproductive assistance. They have discussed and would like to proceed with IUI    O:  /68 (BP Location: Right arm, Patient Position: Sitting, Cuff Size: Adult Regular)   Pulse 76   Wt 68 kg (150 lb)   LMP 10/01/2023 (Approximate)   BMI 26.57 kg/m    Gen: Well-appearing, NAD  Pulm: nonlabored  Psych: appropriate mood and affect      A/P:  Ms. 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 Will plan to start with letrozole 5mg days 3-7, US between days 10-12 and ovidrel+IUI with her next cycle. Discussed increased risk of multiples with ART, which they accept. Will send a MyChart when her periods starts.     Kavya Nova MD  OB/GYN  10/20/2023 1:24 PM

## 2023-10-20 NOTE — NURSING NOTE
Chief Complaint   Patient presents with    RECHECK     Infertility        Medication Reconciliation: complete      Tania Thacker LPN........................10/20/2023  1:30 PM

## 2023-10-28 ENCOUNTER — MYC MEDICAL ADVICE (OUTPATIENT)
Dept: OBGYN | Facility: OTHER | Age: 33
End: 2023-10-28
Payer: COMMERCIAL

## 2023-10-28 DIAGNOSIS — N97.9 SECONDARY FEMALE INFERTILITY: Primary | ICD-10-CM

## 2023-10-30 RX ORDER — LETROZOLE 2.5 MG/1
5 TABLET, FILM COATED ORAL DAILY
Qty: 10 TABLET | Refills: 0 | Status: SHIPPED | OUTPATIENT
Start: 2023-10-30 | End: 2024-01-15

## 2023-10-30 NOTE — TELEPHONE ENCOUNTER
"Per Kavya Nova MD office note on 10/20/23 \"Will plan to start with letrozole 5mg days 3-7, US between days 10-12 and ovidrel+IUI with her next cycle.\"     Patient started her period on 10/28/23 per Kavya Nova MD last note on 10/20/23 the following plan was made: Letrozole 5 mg for 5 days (10/30-11/03) with ultrasound between 11/06 and 11/08. Please review and advise.     Anne Juarez RN on 10/30/2023 at 9:44 AM     "

## 2023-11-07 ENCOUNTER — HOSPITAL ENCOUNTER (OUTPATIENT)
Dept: ULTRASOUND IMAGING | Facility: OTHER | Age: 33
Discharge: HOME | End: 2023-11-07
Attending: STUDENT IN AN ORGANIZED HEALTH CARE EDUCATION/TRAINING PROGRAM
Payer: COMMERCIAL

## 2023-11-07 ENCOUNTER — ALLIED HEALTH/NURSE VISIT (OUTPATIENT)
Dept: OBGYN | Facility: OTHER | Age: 33
End: 2023-11-07
Attending: STUDENT IN AN ORGANIZED HEALTH CARE EDUCATION/TRAINING PROGRAM
Payer: COMMERCIAL

## 2023-11-07 DIAGNOSIS — N97.9 SECONDARY FEMALE INFERTILITY: ICD-10-CM

## 2023-11-07 DIAGNOSIS — N97.9 FEMALE INFERTILITY: Primary | ICD-10-CM

## 2023-11-07 PROCEDURE — 76857 US EXAM PELVIC LIMITED: CPT

## 2023-11-07 NOTE — PROGRESS NOTES
Plan reviewed by Dr. Nova, plan to trigger 11/8 with ovidrel. Scheduled IUI on Thursday 11/9/23.  Laura Lala RN on 11/7/2023 at 1:01 PM

## 2023-11-09 ENCOUNTER — OFFICE VISIT (OUTPATIENT)
Dept: OBGYN | Facility: OTHER | Age: 33
End: 2023-11-09
Attending: OBSTETRICS & GYNECOLOGY
Payer: COMMERCIAL

## 2023-11-09 VITALS
SYSTOLIC BLOOD PRESSURE: 102 MMHG | HEART RATE: 64 BPM | BODY MASS INDEX: 26.57 KG/M2 | WEIGHT: 150 LBS | DIASTOLIC BLOOD PRESSURE: 64 MMHG

## 2023-11-09 DIAGNOSIS — N97.9 SECONDARY FEMALE INFERTILITY: ICD-10-CM

## 2023-11-09 DIAGNOSIS — Z31.89 ENCOUNTER FOR ARTIFICIAL INSEMINATION: Primary | ICD-10-CM

## 2023-11-09 PROCEDURE — 58323 SPERM WASHING: CPT | Performed by: OBSTETRICS & GYNECOLOGY

## 2023-11-09 PROCEDURE — 58322 ARTIFICIAL INSEMINATION: CPT | Performed by: OBSTETRICS & GYNECOLOGY

## 2023-11-09 PROCEDURE — G0463 HOSPITAL OUTPT CLINIC VISIT: HCPCS | Mod: 25

## 2023-11-09 NOTE — NURSING NOTE
Chief Complaint   Patient presents with    Procedure     IUI       Medication Reconciliation: complete        Vannessa Sanchez, ESTRELLITAN

## 2023-11-09 NOTE — PROGRESS NOTES
Follow-Up Visit    S: Ms. Sue De La Fuente is a 33 year old  here for IUI for secondary infertility due to male factor.    O:  /64   Pulse 64   Wt 68 kg (150 lb)   LMP 10/01/2023 (Approximate)   BMI 26.57 kg/m    Gen: Well-appearing, NAD  Pulm: nonlabored  Psych: appropriate mood and affect    Pelvic:  Normal appearing external female genitalia. Normal hair distribution. Vagina is without lesions. No significant discharge. Cervix normal, no lesions    Procedure:  Timeout was performed and the semen sample verified with her partner's  Information. A pre- and post-prep semen analysis was done showing a adequate count (1 mil/mL) with good motility (75%). She was positioned in dorsal lithotomy and a speculum inserted visualizing her cervix.The Tomcat catheter inserted through the cervix into the uterus. The semen sample of 1 ml was delivered into the endometrial cavity.  The cervix was released from the tenaculum and the speculum removed. The patient was kept in supine position for 15 min and then dismissed from the clinic in stable condition. She was instructed to have timed intercourse with her spouse for the next two consecutive days as well.       A/P:  Ms. Sue De La Fuente is a 33 year old  here for IUI. Instructed to take a pregnancy test if no menses in two weeks and call clinic with results.    Kavya Nova MD  OB/GYN  2023 11:08 AM

## 2023-11-28 ENCOUNTER — MYC MEDICAL ADVICE (OUTPATIENT)
Dept: OBGYN | Facility: OTHER | Age: 33
End: 2023-11-28
Payer: COMMERCIAL

## 2024-01-05 ENCOUNTER — HOSPITAL ENCOUNTER (OUTPATIENT)
Dept: GENERAL RADIOLOGY | Facility: OTHER | Age: 34
Discharge: HOME | End: 2024-01-05
Payer: OTHER MISCELLANEOUS

## 2024-01-05 ENCOUNTER — OFFICE VISIT (OUTPATIENT)
Dept: FAMILY MEDICINE | Facility: OTHER | Age: 34
End: 2024-01-05
Attending: STUDENT IN AN ORGANIZED HEALTH CARE EDUCATION/TRAINING PROGRAM
Payer: OTHER MISCELLANEOUS

## 2024-01-05 VITALS
HEIGHT: 63 IN | SYSTOLIC BLOOD PRESSURE: 120 MMHG | BODY MASS INDEX: 28.47 KG/M2 | OXYGEN SATURATION: 98 % | TEMPERATURE: 98.4 F | DIASTOLIC BLOOD PRESSURE: 66 MMHG | RESPIRATION RATE: 16 BRPM | WEIGHT: 160.7 LBS | HEART RATE: 76 BPM

## 2024-01-05 DIAGNOSIS — R20.0 NUMBNESS AND TINGLING IN LEFT ARM: ICD-10-CM

## 2024-01-05 DIAGNOSIS — M25.512 ACUTE PAIN OF LEFT SHOULDER: ICD-10-CM

## 2024-01-05 DIAGNOSIS — S49.92XA SHOULDER INJURY, LEFT, INITIAL ENCOUNTER: ICD-10-CM

## 2024-01-05 DIAGNOSIS — R20.2 NUMBNESS AND TINGLING IN LEFT ARM: ICD-10-CM

## 2024-01-05 DIAGNOSIS — S49.92XA SHOULDER INJURY, LEFT, INITIAL ENCOUNTER: Primary | ICD-10-CM

## 2024-01-05 PROCEDURE — 73030 X-RAY EXAM OF SHOULDER: CPT | Mod: LT

## 2024-01-05 PROCEDURE — 99203 OFFICE O/P NEW LOW 30 MIN: CPT

## 2024-01-05 RX ORDER — PREDNISONE 20 MG/1
TABLET ORAL
Qty: 20 TABLET | Refills: 0 | Status: SHIPPED | OUTPATIENT
Start: 2024-01-05 | End: 2024-05-07

## 2024-01-05 RX ORDER — CYCLOBENZAPRINE HCL 10 MG
10 TABLET ORAL 3 TIMES DAILY PRN
Qty: 30 TABLET | Refills: 0 | Status: SHIPPED | OUTPATIENT
Start: 2024-01-05 | End: 2024-05-07

## 2024-01-05 ASSESSMENT — PATIENT HEALTH QUESTIONNAIRE - PHQ9
SUM OF ALL RESPONSES TO PHQ QUESTIONS 1-9: 0
SUM OF ALL RESPONSES TO PHQ QUESTIONS 1-9: 0
10. IF YOU CHECKED OFF ANY PROBLEMS, HOW DIFFICULT HAVE THESE PROBLEMS MADE IT FOR YOU TO DO YOUR WORK, TAKE CARE OF THINGS AT HOME, OR GET ALONG WITH OTHER PEOPLE: NOT DIFFICULT AT ALL

## 2024-01-05 ASSESSMENT — PAIN SCALES - GENERAL: PAINLEVEL: MODERATE PAIN (5)

## 2024-01-05 NOTE — NURSING NOTE
"Chief Complaint   Patient presents with    Work Comp    Shoulder Injury     Left; happened lifting a resident at work        Initial /66   Pulse 76   Temp 98.4  F (36.9  C) (Tympanic)   Resp 16   Ht 1.6 m (5' 3\")   Wt 72.9 kg (160 lb 11.2 oz)   SpO2 98%   Breastfeeding No   BMI 28.47 kg/m   Estimated body mass index is 28.47 kg/m  as calculated from the following:    Height as of this encounter: 1.6 m (5' 3\").    Weight as of this encounter: 72.9 kg (160 lb 11.2 oz).  Medication Review: complete    The next two questions are to help us understand your food security.  If you are feeling you need any assistance in this area, we have resources available to support you today.          1/5/2024   SDOH- Food Insecurity   Within the past 12 months, did you worry that your food would run out before you got money to buy more? N   Within the past 12 months, did the food you bought just not last and you didn t have money to get more? N         Health Care Directive:  Patient does not have a Health Care Directive or Living Will: Discussed advance care planning with patient; however, patient declined at this time.    Paulette Finnegan CMA      "

## 2024-01-05 NOTE — PROGRESS NOTES
Work comp    ASSESSMENT/PLAN:    1. Acute pain of left shoulder  2. Shoulder injury, left, initial encounter  3. Numbness and tingling in left arm    - XR Shoulder Left 2 Views; Future    - predniSONE (DELTASONE) 20 MG tablet; Take 3 tabs by mouth daily x 3 days, then 2 tabs daily x 3 days, then 1 tab daily x 3 days, then 1/2 tab daily x 3 days.  Dispense: 20 tablet; Refill: 0    - cyclobenzaprine (FLEXERIL) 10 MG tablet; Take 1 tablet (10 mg) by mouth 3 times daily as needed for muscle spasms  Dispense: 30 tablet; Refill: 0    - Continue to take Tylenol and Ibuprofen for pain and inflammation every 6 hours.  May take up to 4000 mg of Tylenol and 3200 mg of Ibuprofen total daily in divided doses.     - RICE -Rest, ice, elevate.  Read attached information.     - Discussed warning signs/symptoms indicative of need to f/unit(s)    - Follow up with work comp provider 01/08/2024 at 0920.     - Follow up if symptoms persist or worsen or concerns    - I explained my diagnostic considerations and recommendations to the patient, who voiced understanding and agreement with the treatment plan. All questions were answered. We discussed potential side effects of any prescribed or recommended therapies, as well as expectations for response to treatments.    SUZAN Cooney CNP  1/5/2024  11:43 AM    HPI:    Sue De La Fuente is a 33 year old female  who presents to [facility] today for concerns of a left shoulder injury that occurred yesterday around 0800 at work.  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  States her arm goes to sleep when she moves it but fingers are numb and tingling constantly.  Complains of a burning sensation anterior and posterior shoulder region.  Pt states her hand turned purple a couple times yesterday.  Pt has been applying ice and taking ibuprofen for inflammation and pain. Pt states she is still working but with limited  "use of the left arm.     Past medical history, past surgical history, current medications and allergies reviewed and accurate to the best of my knowledge.      ROS:  Refer to HPI    /66   Pulse 76   Temp 98.4  F (36.9  C) (Tympanic)   Resp 16   Ht 1.6 m (5' 3\")   Wt 72.9 kg (160 lb 11.2 oz)   SpO2 98%   Breastfeeding No   BMI 28.47 kg/m      EXAM:  General Appearance: Well appearing 33 year old female, appropriate appearance for age. Acute distress with left shoulder pain.  Respiratory: normal chest wall and respirations.  Normal effort.  Clear to auscultation bilaterally, no wheezing, crackles or rhonchi.  No increased work of breathing.  No cough appreciated.  Cardiac: RRR with no murmurs   Musculoskeletal:  Unequal movement of bilateral upper extremities.  Pt unable to raise left arm more than 90 degrees with active ROM, 140 degrees with PROM.  Negative empty can test.  Equal hand grasps.  Strength in left shoulder decreased with downward pressure.  Equal movement of bilateral lower extremities.  Normal gait.    Neuro: Alert and oriented to person, place, and time.    Psychological: normal affect, alert, oriented, and pleasant.     Xray:  Results for orders placed or performed during the hospital encounter of 01/05/24   XR Shoulder Left G/E 3 Views     Status: None    Narrative    PROCEDURE:  XR SHOULDER LEFT G/E 3 VIEWS    HISTORY: Acute pain of left shoulder; Shoulder injury, left, initial  encounter; Numbness and tingling in left arm; Numbness and tingling in  left arm    COMPARISON: No relevant priors available for comparison    TECHNIQUE:  XR SHOULDER LEFT 3 VIEWS    FINDINGS:   No acute fracture or dislocation is identified. No suspicious osseous  lesion. The joint spaces are preserved.     No foreign body or subcutaneous emphysema.        Impression    IMPRESSION:   No acute osseous abnormality.    STEPHANIE MACE MD         SYSTEM ID:  [id number]     "

## 2024-01-05 NOTE — PATIENT INSTRUCTIONS
It was a pleasure meeting you today, I hope you feel better soon!    Apt scheduled with work comp provider on 1/08/2024 @ 6253

## 2024-01-15 ENCOUNTER — MYC MEDICAL ADVICE (OUTPATIENT)
Dept: OBGYN | Facility: OTHER | Age: 34
End: 2024-01-15
Payer: COMMERCIAL

## 2024-01-15 DIAGNOSIS — N97.9 SECONDARY FEMALE INFERTILITY: ICD-10-CM

## 2024-01-15 RX ORDER — LETROZOLE 2.5 MG/1
5 TABLET, FILM COATED ORAL DAILY
Qty: 10 TABLET | Refills: 0 | Status: SHIPPED | OUTPATIENT
Start: 2024-01-15 | End: 2024-05-07

## 2024-01-15 NOTE — TELEPHONE ENCOUNTER
Patient's period started today 01/15/2024. She was on Letrozole 5 mg for days 3-7 with US on 12 with IUI on day 13.     US and Letrozole pending for review. What day would you like the US on?     Anne Juarez RN on 1/15/2024 at 3:35 PM

## 2024-01-15 NOTE — TELEPHONE ENCOUNTER
Plan for US on 1/25/24 on day 11 of cycle. IUI on 1/26/24.     Ronda Bentley, SUZAN CNP on 1/15/2024 at 3:43 PM

## 2024-01-16 NOTE — TELEPHONE ENCOUNTER
Chart accessed d/t patient calling to confirm starting letrozole tomorrow.  Armida Jha RN on 1/16/2024 at 10:15 AM

## 2024-01-25 ENCOUNTER — ALLIED HEALTH/NURSE VISIT (OUTPATIENT)
Dept: OBGYN | Facility: OTHER | Age: 34
End: 2024-01-25
Payer: COMMERCIAL

## 2024-01-25 ENCOUNTER — HOSPITAL ENCOUNTER (OUTPATIENT)
Dept: ULTRASOUND IMAGING | Facility: OTHER | Age: 34
Discharge: HOME | End: 2024-01-25
Payer: COMMERCIAL

## 2024-01-25 DIAGNOSIS — N97.9 SECONDARY FEMALE INFERTILITY: ICD-10-CM

## 2024-01-25 DIAGNOSIS — N97.9 FEMALE INFERTILITY: ICD-10-CM

## 2024-01-25 PROCEDURE — 76857 US EXAM PELVIC LIMITED: CPT

## 2024-01-25 NOTE — PROGRESS NOTES
Patient presents to clinic for follicle study results. Follicle study reviewed by Kavya Nova MD who verbalized that patient will need to trigger with ovidrel on 01/28/2024 and then have an IUI done on 01/29/2024. Patient was given prescription and advised to trigger on 01/28/24 and come in for IUI on 01/29/2024. Patient verbalized understanding and had no questions at this time.       Anne Juarez RN on 1/25/2024 at 1:26 PM

## 2024-01-29 ENCOUNTER — OFFICE VISIT (OUTPATIENT)
Dept: OBGYN | Facility: OTHER | Age: 34
End: 2024-01-29
Attending: OBSTETRICS & GYNECOLOGY
Payer: COMMERCIAL

## 2024-01-29 VITALS
DIASTOLIC BLOOD PRESSURE: 66 MMHG | HEART RATE: 96 BPM | WEIGHT: 158.2 LBS | SYSTOLIC BLOOD PRESSURE: 124 MMHG | BODY MASS INDEX: 28.02 KG/M2

## 2024-01-29 DIAGNOSIS — Z31.89 ENCOUNTER FOR ARTIFICIAL INSEMINATION: ICD-10-CM

## 2024-01-29 DIAGNOSIS — N97.9 FEMALE INFERTILITY: Primary | ICD-10-CM

## 2024-01-29 PROCEDURE — 58322 ARTIFICIAL INSEMINATION: CPT | Performed by: STUDENT IN AN ORGANIZED HEALTH CARE EDUCATION/TRAINING PROGRAM

## 2024-01-29 PROCEDURE — 58323 SPERM WASHING: CPT | Performed by: STUDENT IN AN ORGANIZED HEALTH CARE EDUCATION/TRAINING PROGRAM

## 2024-01-29 PROCEDURE — G0463 HOSPITAL OUTPT CLINIC VISIT: HCPCS | Mod: 25

## 2024-01-29 ASSESSMENT — PATIENT HEALTH QUESTIONNAIRE - PHQ9
SUM OF ALL RESPONSES TO PHQ QUESTIONS 1-9: 10
10. IF YOU CHECKED OFF ANY PROBLEMS, HOW DIFFICULT HAVE THESE PROBLEMS MADE IT FOR YOU TO DO YOUR WORK, TAKE CARE OF THINGS AT HOME, OR GET ALONG WITH OTHER PEOPLE: SOMEWHAT DIFFICULT
SUM OF ALL RESPONSES TO PHQ QUESTIONS 1-9: 10

## 2024-01-29 NOTE — NURSING NOTE
Pt presents to clinic today for IUI.      Medication Reconciliation: complete  Rosalia Thompson LPN

## 2024-01-29 NOTE — PROGRESS NOTES
IUI Visit    S: Ms. De La Fuente is a 33 year old  here for Intrauterine insemination. She has had ovulation induction this cycle. Her most recent US was on  which showed a 14 mm follicle and 7 mm endometrial stripe.    She performed an Ovidrel trigger on     Today her partner has presented his sample which as been washed and processed by the lab.     O:  /66   Pulse 96   Wt 71.8 kg (158 lb 3.2 oz)   LMP 01/15/2024   BMI 28.02 kg/m      Gen: Well-appearing, NAD  Pulm: nonlabored  Abd: Soft, non-tender, non-distende  Ext: No LE edema, extremities warm and well perfused  Pelvic:  Normal appearing external female genitalia. Normal hair distribution. Vagina is without lesions. No vaginal discharge. Cervix with no lesions, no cervical motion tenderness. Uterus is small, mobile, non-tender. No adnexal tenderness or masses    Intrauterine insemintation Note:  After the risks and benefits of the procedure were discussed with the patient, informed consent was obtained.  Risks were discussed including, but not limited to, bleeding, cramping, infection, uterine perforation.  A bimanual exam was performed to reveal an anteverted uterus. The speculum was gently introduced to visualize the cervix without any gel. The cervix was cleaned with a clean, dry swab. The cervix was then gently washed with a spray of body-temperature saline. The washed sperm sample was once again confirmed to have the appropriate name and birth-date. The catheter was gently introduced through the cervical os. After gentle repositioning, it passed through the internal cervical os as well. The plunger was gently pressed and the sample injected. The speculum was removed and she remained in a laying position for 15 minutes after the procedure. She tolerated the procedure well.     A/P:  Ms. De La Fuente is a 33 year old  here for IUI.  The IUI procedure as performed without difficulty or complications as noted above.  She will call the  clinic back with return of her period or positive pregnancy test.    Answers submitted by the patient for this visit:  Patient Health Questionnaire (Submitted on 1/29/2024)  If you checked off any problems, how difficult have these problems made it for you to do your work, take care of things at home, or get along with other people?: Somewhat difficult  PHQ9 TOTAL SCORE: 10

## 2024-03-15 ENCOUNTER — ALLIED HEALTH/NURSE VISIT (OUTPATIENT)
Dept: OBGYN | Facility: OTHER | Age: 34
End: 2024-03-15
Attending: OBSTETRICS & GYNECOLOGY
Payer: COMMERCIAL

## 2024-03-15 ENCOUNTER — HOSPITAL ENCOUNTER (OUTPATIENT)
Dept: ULTRASOUND IMAGING | Facility: OTHER | Age: 34
Discharge: HOME | End: 2024-03-15
Attending: OBSTETRICS & GYNECOLOGY
Payer: COMMERCIAL

## 2024-03-15 DIAGNOSIS — N97.9 SECONDARY FEMALE INFERTILITY: ICD-10-CM

## 2024-03-15 DIAGNOSIS — N97.9 SECONDARY FEMALE INFERTILITY: Primary | ICD-10-CM

## 2024-03-15 PROCEDURE — 76857 US EXAM PELVIC LIMITED: CPT

## 2024-03-15 NOTE — PROGRESS NOTES
Ultrasound reviewed by Kavya Nova MD. Patient to trigger with ovidrel injection today, TI tonight and the next 72 hours. Will call after 2 weeks with pregnancy test results or start of cycle.  Patient in agreement with plan.  Armida Jha RN on 3/15/2024 at 10:53 AM

## 2024-04-27 ENCOUNTER — HEALTH MAINTENANCE LETTER (OUTPATIENT)
Age: 34
End: 2024-04-27

## 2024-05-07 ENCOUNTER — OFFICE VISIT (OUTPATIENT)
Dept: FAMILY MEDICINE | Facility: OTHER | Age: 34
End: 2024-05-07
Attending: NURSE PRACTITIONER
Payer: COMMERCIAL

## 2024-05-07 VITALS
TEMPERATURE: 97.5 F | HEIGHT: 63 IN | HEART RATE: 92 BPM | BODY MASS INDEX: 29.98 KG/M2 | WEIGHT: 169.2 LBS | RESPIRATION RATE: 16 BRPM | OXYGEN SATURATION: 99 % | DIASTOLIC BLOOD PRESSURE: 66 MMHG | SYSTOLIC BLOOD PRESSURE: 122 MMHG

## 2024-05-07 DIAGNOSIS — R29.898 WEAKNESS OF LEFT SHOULDER: ICD-10-CM

## 2024-05-07 DIAGNOSIS — M25.512 ACUTE PAIN OF LEFT SHOULDER: ICD-10-CM

## 2024-05-07 DIAGNOSIS — M62.838 MUSCLE SPASM: ICD-10-CM

## 2024-05-07 DIAGNOSIS — S49.92XD INJURY OF LEFT SHOULDER, SUBSEQUENT ENCOUNTER: ICD-10-CM

## 2024-05-07 DIAGNOSIS — Z02.6 ENCOUNTER RELATED TO WORKER'S COMPENSATION CLAIM: Primary | ICD-10-CM

## 2024-05-07 PROCEDURE — G0463 HOSPITAL OUTPT CLINIC VISIT: HCPCS

## 2024-05-07 PROCEDURE — 99214 OFFICE O/P EST MOD 30 MIN: CPT | Performed by: NURSE PRACTITIONER

## 2024-05-07 RX ORDER — CYCLOBENZAPRINE HCL 10 MG
10 TABLET ORAL 3 TIMES DAILY PRN
Qty: 30 TABLET | Refills: 0 | Status: SHIPPED | OUTPATIENT
Start: 2024-05-07

## 2024-05-07 ASSESSMENT — PATIENT HEALTH QUESTIONNAIRE - PHQ9
SUM OF ALL RESPONSES TO PHQ QUESTIONS 1-9: 6
10. IF YOU CHECKED OFF ANY PROBLEMS, HOW DIFFICULT HAVE THESE PROBLEMS MADE IT FOR YOU TO DO YOUR WORK, TAKE CARE OF THINGS AT HOME, OR GET ALONG WITH OTHER PEOPLE: SOMEWHAT DIFFICULT
SUM OF ALL RESPONSES TO PHQ QUESTIONS 1-9: 6

## 2024-05-07 ASSESSMENT — PAIN SCALES - GENERAL: PAINLEVEL: SEVERE PAIN (6)

## 2024-05-07 NOTE — PATIENT INSTRUCTIONS
Physical therapy referral placed. Recommend continued limited use of the left upper extremity.     Use of nsaid for pain/inflammation. 800 mg ibuprofen 3x per day recommended. May alternate with acetaminophen (tylenol) 1000 mg 3x per day also.     Follow up referral placed for Dr. Toño Garcia (sports medicine MD) for further insight. Depending on his exam findings, he may want to consider further imaging such as with MRI, will leave this to his discretion.

## 2024-05-07 NOTE — PROGRESS NOTES
Assessment & Plan   Problem List Items Addressed This Visit    None  Visit Diagnoses       Encounter related to worker's compensation claim    -  Primary    Relevant Orders    Physical Therapy  Referral    Orthopedic  Referral    Injury of left shoulder, subsequent encounter        Relevant Orders    Physical Therapy  Referral    Orthopedic  Referral    Weakness of left shoulder        Relevant Medications    cyclobenzaprine (FLEXERIL) 10 MG tablet    Other Relevant Orders    Physical Therapy  Referral    Orthopedic  Referral    Wrist/Arm/Hand Bracking Supplies Order Sling; Left    Acute pain of left shoulder        Relevant Medications    cyclobenzaprine (FLEXERIL) 10 MG tablet    Other Relevant Orders    Wrist/Arm/Hand Bracking Supplies Order Sling; Left    Muscle spasm               1. Encounter related to worker's compensation claim  2. Injury of left shoulder, subsequent encounter  3. Weakness of left shoulder  - Physical Therapy  Referral  - Orthopedic  Referral  Reviewed left shoulder XR repot from [place] ER visit which showed bone spurring but ruled out acute fracture or dislocation. Did not perform updated xr today.   - Wrist/Arm/Hand Bracking Supplies Order Sling; Left (prior sling was ill fitting)   Differential includes; musculoskeletal sprain, rotator cuff injury or partial tear, less likely complete tear based on exam finding or other tendonitis of the shoulder    4. Acute pain of left shoulder  5. Muscle spasm  - cyclobenzaprine (FLEXERIL) 10 MG tablet; Take 1 tablet (10 mg) by mouth 3 times daily as needed for muscle spasms  Dispense: 30 tablet; Refill: 0  Continue alternating tylenol and ibuprofen for pain control as needed      MED REC REQUIRED  Post Medication Reconciliation Status: discharge medications reconciled, continue medications without change - ADD flexeril   Nicotine/Tobacco Cessation  She reports that she has  "been smoking cigarettes. She has a 5 pack-year smoking history. She has quit using smokeless tobacco.  Her smokeless tobacco use included chew.  Nicotine/Tobacco Cessation Plan  Information offered: Patient not interested at this time      BMI  Estimated body mass index is 29.97 kg/m  as calculated from the following:    Height as of this encounter: 1.6 m (5' 3\").    Weight as of this encounter: 76.7 kg (169 lb 3.2 oz).   Weight management plan: Discussed healthy diet and exercise guidelines    CONSULTATION/REFERRAL to orthopedic MD for follow up and further evaluation; may need to consider MRI pending physical therapy progress and more time. Did not feel MRI was urgently warranted.    See patient instructions.     No follow-ups on file.      Subjective   Sue is a 33 year old, presenting for the following health issues:  ER F/U        5/7/2024     8:59 AM   Additional Questions   Roomed by DENYS Caldwell   Accompanied by Self     History of Present Illness       Reason for visit:  Shoulder injury  Symptom onset:  1-2 weeks ago    She eats 2-3 servings of fruits and vegetables daily.She consumes 4 sweetened beverage(s) daily.She exercises with enough effort to increase her heart rate 30 to 60 minutes per day.  She exercises with enough effort to increase her heart rate 4 days per week. She is missing 2 dose(s) of medications per week.       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 She states she felt/heard a pop and had immediate discomfort.   Her left hand swells intermittently. Sensitive to touch on the back of the shoulder and sore. She has been feeling/hearing it pop and crack daily since the injury occurred. She has been working since the injury. Work did not " "follow her restrictions well. Taking ibuprofen for pain couple times a day - 400-600 mg at a time. Has been icing shoulder, the sling she got in the ER does not fit her well so therefore has not really been wearing it for the past few days. Has been wearing it while at work but it is ill fitting (way too big). No prior shoulder surgeries. She is right hand dominant. She reports the pain is a constant 5/10. It is an ache/burning sensation. She had leftover flexeril from an old knee injury which she tried and it seemed to help. No numbness or tingling in her hand currently, but the left hand has gone numb at times. Hurts a lot when trying to lift, or overhead work. Hard to sleep due to the discomfort at times.     Prior to shoulder injury; patient was attempting to conceive. She is not currently pregnant. Putting a hold on this until further evaluation of this shoulder injury.        Review of Systems  Constitutional, HEENT, cardiovascular, pulmonary, gi and gu systems are negative, except as otherwise noted. See above.       Objective    /66   Pulse 92   Temp 97.5  F (36.4  C) (Tympanic)   Resp 16   Ht 1.6 m (5' 3\")   Wt 76.7 kg (169 lb 3.2 oz)   LMP 04/02/2024 (Exact Date)   SpO2 99%   Breastfeeding No   BMI 29.97 kg/m    Body mass index is 29.97 kg/m .  Physical Exam  Vitals and nursing note reviewed.   Constitutional:       General: She is not in acute distress.     Appearance: Normal appearance. She is not toxic-appearing.   Pulmonary:      Effort: Pulmonary effort is normal. No respiratory distress.   Skin:     General: Skin is warm and dry.      Capillary Refill: Capillary refill takes less than 2 seconds.   Neurological:      General: No focal deficit present.      Mental Status: She is alert and oriented to person, place, and time.        LEFT SHOULDER PHYSICAL EXAMINATION:  Gross Examination: shoulders appear symmetrical in appearance, no signs of bony deformity over the AC, clavicle or " glenohumeral joints. No signs of previous or current trauma. No signs of ecchymosis. Skin is intact.     Palpation: Tenderness to palpation: posterior shoulder muscles: upper trapezius region    ROM: flexion painful, extension painful, abduction full, adduction full, IR painful, decreased, ER painful, decreased     Special Testing:   Shoulder Strength:4/5         Instability:    Apprehension: negative    Cross Over: negative    Neurovascular status: distal pulses are intact and are 2+. Two point discrimination intact. Distal capillary refill intact < 3 seconds.         Signed Electronically by: Paulette Dinero NP

## 2024-05-07 NOTE — NURSING NOTE
"Chief Complaint   Patient presents with    ER F/U       Initial /66   Pulse 92   Temp 97.5  F (36.4  C) (Tympanic)   Resp 16   Ht 1.6 m (5' 3\")   Wt 76.7 kg (169 lb 3.2 oz)   LMP 04/02/2024 (Exact Date)   SpO2 99%   Breastfeeding No   BMI 29.97 kg/m   Estimated body mass index is 29.97 kg/m  as calculated from the following:    Height as of this encounter: 1.6 m (5' 3\").    Weight as of this encounter: 76.7 kg (169 lb 3.2 oz).  Medication Review: complete    The next two questions are to help us understand your food security.  If you are feeling you need any assistance in this area, we have resources available to support you today.          5/4/2024   SDOH- Food Insecurity   Within the past 12 months, did you worry that your food would run out before you got money to buy more? N   Within the past 12 months, did the food you bought just not last and you didn t have money to get more? N         Health Care Directive:  Patient does not have a Health Care Directive or Living Will: Discussed advance care planning with patient; however, patient declined at this time.    Paulette Finnegan CMA        "

## 2024-05-15 ENCOUNTER — OFFICE VISIT (OUTPATIENT)
Dept: FAMILY MEDICINE | Facility: OTHER | Age: 34
End: 2024-05-15
Attending: NURSE PRACTITIONER
Payer: OTHER MISCELLANEOUS

## 2024-05-15 VITALS
DIASTOLIC BLOOD PRESSURE: 62 MMHG | TEMPERATURE: 96.9 F | WEIGHT: 169.6 LBS | OXYGEN SATURATION: 98 % | SYSTOLIC BLOOD PRESSURE: 116 MMHG | HEART RATE: 87 BPM | RESPIRATION RATE: 16 BRPM | BODY MASS INDEX: 30.04 KG/M2

## 2024-05-15 DIAGNOSIS — G56.02 CARPAL TUNNEL SYNDROME OF LEFT WRIST: ICD-10-CM
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
## 2024-05-15 DIAGNOSIS — M75.42 SUBACROMIAL IMPINGEMENT OF LEFT SHOULDER: ICD-10-CM

## 2024-05-15 DIAGNOSIS — S46.912A STRAIN OF LEFT SHOULDER, INITIAL ENCOUNTER: ICD-10-CM

## 2024-05-15 PROCEDURE — 99214 OFFICE O/P EST MOD 30 MIN: CPT | Performed by: FAMILY MEDICINE

## 2024-05-15 ASSESSMENT — PAIN SCALES - GENERAL: PAINLEVEL: SEVERE PAIN (6)

## 2024-05-15 NOTE — PROGRESS NOTES
Sports Medicine Office Note    HPI:  33-year-old female coming in for evaluation of left shoulder pain.  She has an approximately 1 year history of intermittent left shoulder pain.  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  She underwent 2 weeks of anti-inflammatories followed by return to work without restriction.  She does not feel like her shoulder completely healed from this injury.  On 4/24 she suffered a repeat injury, again trying to roll a patient.  She felt a pop in her shoulder.  Since that time she has been dealing with significant pain.  At the time of the injury she was seen in the ER.  X-rays were negative.  She followed up in [facility] on 5/7.  She was given work restrictions and placed in a sling.  Her current pain is 6/10.  She has 8-9/10 with lifting.  She characterized the pain as sharp, stabbing, aching, and burning.  She has been using heat and ice to help with her symptoms.  She has also been using ibuprofen and cyclobenzaprine.  She does not feel like the cyclobenzaprine is helping.  She gets a small amount of symptom improvement with the ibuprofen.  She does note some mild numbness in her second and third digit.  She feels that this may be connected to her shoulder.      EXAM:  /62 (BP Location: Right arm, Patient Position: Sitting, Cuff Size: Adult Regular)   Pulse 87   Temp 96.9  F (36.1  C) (Temporal)   Resp 16   Wt 76.9 kg (169 lb 9.6 oz)   LMP 04/02/2024 (Exact Date)   SpO2 98%   BMI 30.04 kg/m    MUSCULOSKELETAL EXAM:  LEFT SHOULDER  Inspection:  -No gross deformity  -No bruising or swelling  -Scars:  None    Tenderness to palpation of the:  -SC joint:  Negative  -AC joint:  Negative  -Clavicle:  Negative  -Biceps tendon in bicipital groove:  Negative  -Deltoid musculature:  Negative  -Upper trapezius musculature: Mild pain    Range of Motion:  -Active flexion:  180 left, 180 right  -Active abduction:  180 left, 180 right  -Passive  external rotation in 90 of abduction:  95  -Passive internal rotation in 90 of abduction:  45    Strength:  -Supraspinatus:  5/5  -Infraspinatus:  5/5  -Subscapularis:  5/5  -Deltoid:  5/5    Special Tests:  -Maurice test:  Negative  -Moore test: Positive  -Neer test: Positive  -Mid-arc pain: Present  -O Cristofer active compression test:  Negative  -Crossarm adduction test:  Negative  -Apprehension test:  Negative  -Relocation test:  Negative  -Lag sign:  Negative  -Scapular dyskinesis:  Absent    Other:  -Intact sensation to light touch distally.  -No signs of cyanosis. Normal skin temperature of the upper extremity.  -Elbow:  No gross deformity. Full range of motion.  -Hand/wrist:  No gross deformity. Full range of motion.  Positive Phalan.  Negative Durkan/Tinel.  -Right shoulder:  No gross deformity. No palpable tenderness. Normal strength.      IMAGIN2024: 3 view left shoulder x-ray  - No fracture, dislocation, or bony lesion      ASSESSMENT/PLAN:  Diagnoses and all orders for this visit:  Accident while engaged in work-related activity  -     Orthopedic  Referral  -     Physical Therapy  Referral; Future  Strain of left shoulder, initial encounter  -     Orthopedic  Referral  -     Physical Therapy  Referral; Future  Subacromial impingement of left shoulder  -     Orthopedic  Referral  -     Physical Therapy  Referral; Future  Carpal tunnel syndrome of left wrist    33-year-old female with subacromial impingement of the left shoulder.  She likely suffered a shoulder strain.  No findings on examination or radiographs to suggest structural abnormality of the shoulder.  X-rays from  were personally reviewed in the office with the findings as demonstrated above by my interpretation.  Radiologist interpretation of outside x-rays from  was also reviewed.  No indications for advanced imaging or surgical intervention at this time.  Treatment options  include rest, activity modification, physical therapy, ice, topical medications, oral medications, and injections.  - Continue ibuprofen as needed  - Ice and OTC APAP as needed  - Handout given with home exercises for subacromial bursitis  - Stop using the sling  - Referral placed to physical therapy  - Workability form filled out  - If still having pain after 4-6 weeks of physical therapy, patient should follow-up in the office for repeat examination  - If still having pain consider CSI and/or MRI  - Follow-up sooner if worsening symptoms      Toño Garcia MD  5/15/2024  12:59 PM    Total time spent with this patient was 39 minutes which included chart review, visualization and independent interpretation of images, time spent with the patient, and documentation.    Procedure time:  0 minute(s)

## 2024-06-20 ENCOUNTER — THERAPY VISIT (OUTPATIENT)
Dept: PHYSICAL THERAPY | Facility: OTHER | Age: 34
End: 2024-06-20
Attending: FAMILY MEDICINE
Payer: OTHER MISCELLANEOUS

## 2024-06-20 DIAGNOSIS — M75.42 SUBACROMIAL IMPINGEMENT OF LEFT SHOULDER: ICD-10-CM

## 2024-06-20 DIAGNOSIS — S46.912A STRAIN OF LEFT SHOULDER, INITIAL ENCOUNTER: ICD-10-CM
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
## 2024-06-20 PROCEDURE — 97110 THERAPEUTIC EXERCISES: CPT | Mod: GP

## 2024-06-20 PROCEDURE — 97161 PT EVAL LOW COMPLEX 20 MIN: CPT | Mod: GP

## 2024-06-20 NOTE — PROGRESS NOTES
PHYSICAL THERAPY EVALUATION  Type of Visit: Evaluation     Fall Risk Screen:  Fall screen completed by: PT  Have you fallen 2 or more times in the past year?: No  Have you fallen and had an injury in the past year?: No  Is patient a fall risk?: No    Subjective Patient is a 33 year old female presenting to the clinic with left shoulder pain. Patient reports that she was rolling a resident at work and felt a pop with immediate pain. Patient reports a similar injury that happened in January that had healed prior to April injury. Patient presents today reporting difficulties with pain, weight bearing, swelling, stiffness, burning, numbness, tingling, lifting, and locking/catching of joint. Patient describes pain as sharp, dull, aching, burning and stabbing. Patient reports constant pain with an intensity that varies. Pain is worse with lifting, reaching, ADLs, IADLs, work tasks and certain positions. Pain is better with rest, certain positions, heat, cold, and OTC medication. 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 Patient reports that arm will spasm and swelling if lifting more then 20 lbs. If she moves her arm for normal activities she reports feeling a clicking/popping sensation. She states she has inconsistent numbness and tingling in the shoulder. Most of pain is located in the posterior aspect of the shoulder. The only comfortable position she is able to sleep in is on her side with left arm laying on a body pillow.       Presenting condition or subjective complaint:  left shoulder pain  Date of onset: 04/24/24      Prior Level of Function  Transfers: Independent  Ambulation: Independent  ADL: Independent  IADL: Childcare, Driving, Finances, Housekeeping, Laundry, Meal preparation, Medication management, Work, Yard work    Living Environment  Social support:    and immediate family  Type of home:   house  Help at home:   and son    Employment:     CNA  Hobbies/Interests:      Patient goals for therapy:  Decrease pain and improve mobility       Objective   SHOULDER EVALUATION  PAIN: Pain Level at Rest: 6/10  Pain Level with Use: 9/10  Pain Location: shoulder  Pain Quality: Aching, Burning, Dull, Sharp, and Stabbing  Pain Frequency: constant  Pain is Worst: daytime  Pain is Exacerbated By: Activity  Pain is Relieved By: cold, heat, and otc medications  Pain Progression: Improved  INTEGUMENTARY (edema, incisions): WNL  POSTURE: WFL  ROM:  AROM left flexion 115* and abduction 90*. PROM left flexion 140*, abduction 110*, IR 40*, and ER 70*  STRENGTH:  Left shoulder flexion, abduction, IR and ER are 4-/5 with muscle guarding  SPECIAL TESTS:  + Empty can, evans carlotta, painful arc on the left  PALPATION:  Pain with palpation over left biceps tendon as well as posterior aspect of shoulder (inraspinatus, teres minor and major). Pain with palpation into pec muscles on the left.    Assessment & Plan   CLINICAL IMPRESSIONS  Medical Diagnosis: Accident while engaged in work-related activity (Y99.0), Strain of left shoulder, initial encounter (S46.912A), Subacromial impingement of left shoulder (M75.42)    Treatment Diagnosis:     Impression/Assessment: Patient is a 33 year old female presenting with left shoulder pain.  The following significant findings have been identified: Pain, Decreased ROM/flexibility, Decreased strength, and Decreased activity tolerance. These impairments interfere with their ability to perform self care tasks, work tasks, recreational activities, household chores, driving , household mobility, and community mobility as compared to previous level of function.     Clinical Decision Making (Complexity):  Clinical Presentation: Stable/Uncomplicated  Clinical Presentation Rationale: based on medical and personal factors listed in PT evaluation  Clinical Decision Making (Complexity): Low complexity    PLAN OF CARE  Treatment  Interventions:  Modalities: Cryotherapy, Hot Pack, Iontophoresis, Ultrasound, Vasoneumatic Device, iontophoresis with 4% dexamethasone to left shoulder over biceps tendon.  Interventions: Manual Therapy, Neuromuscular Re-education, Therapeutic Activity, Therapeutic Exercise, Aquatic Therapy    Long Term Goals     PT Goal 1  Goal Identifier: Pain  Goal Description: Patient will report pain no greater then 3/10 at night in order to sleep for 6 consecutive hours in the next 4 weeks.  Rationale: to maximize safety and independence with performance of ADLs and functional tasks;to maximize safety and independence within the home;to maximize safety and independence with self cares  Target Date: 07/18/24  PT Goal 2  Goal Identifier: ROM  Goal Description: Patient will be able to demonstrate full pain free AROM flexion and abduction of 180* in order to complete ADLs in the next 8 weeks.  Rationale: to maximize safety and independence with performance of ADLs and functional tasks;to maximize safety and independence within the home;to maximize safety and independence with self cares  Target Date: 08/15/24  PT Goal 3  Goal Identifier: Strength  Goal Description: Patient will demonstrate lifting 50lbs 10x's without increased pain or swelling in order to return to work without restrictions in the next 12 weeks.  Rationale: to maximize safety and independence with performance of ADLs and functional tasks;to maximize safety and independence within the home;to maximize safety and independence with self cares  Target Date: 09/12/24      Frequency of Treatment: 1-2x per week  Duration of Treatment: 12 weeks    Education Assessment:   Learner/Method: Patient;Listening;Reading;Demonstration;Pictures/Video;No Barriers to Learning    Risks and benefits of evaluation/treatment have been explained.   Patient/Family/caregiver agrees with Plan of Care.     Evaluation Time:     PT Eval, Low Complexity Minutes ([id number]): 30     Signing Clinician:  SHILPA PATTEN, PT

## 2024-06-22 ENCOUNTER — APPOINTMENT (OUTPATIENT)
Dept: GENERAL RADIOLOGY | Facility: OTHER | Age: 34
End: 2024-06-22
Attending: PHYSICIAN ASSISTANT
Payer: COMMERCIAL

## 2024-06-22 ENCOUNTER — HOSPITAL ENCOUNTER (EMERGENCY)
Facility: OTHER | Age: 34
Discharge: HOME | End: 2024-06-22
Attending: PHYSICIAN ASSISTANT | Admitting: PHYSICIAN ASSISTANT
Payer: COMMERCIAL

## 2024-06-22 VITALS
SYSTOLIC BLOOD PRESSURE: 125 MMHG | TEMPERATURE: 98.3 F | OXYGEN SATURATION: 99 % | RESPIRATION RATE: 16 BRPM | HEIGHT: 62 IN | WEIGHT: 160 LBS | DIASTOLIC BLOOD PRESSURE: 76 MMHG | HEART RATE: 84 BPM | BODY MASS INDEX: 29.44 KG/M2

## 2024-06-22 DIAGNOSIS — S93.401A RIGHT ANKLE SPRAIN: ICD-10-CM

## 2024-06-22 DIAGNOSIS — M79.671 RIGHT FOOT PAIN: ICD-10-CM

## 2024-06-22 PROCEDURE — 99283 EMERGENCY DEPT VISIT LOW MDM: CPT | Performed by: PHYSICIAN ASSISTANT

## 2024-06-22 PROCEDURE — 73610 X-RAY EXAM OF ANKLE: CPT | Mod: TC,RT

## 2024-06-22 PROCEDURE — 73610 X-RAY EXAM OF ANKLE: CPT | Mod: RT,76

## 2024-06-22 PROCEDURE — 99283 EMERGENCY DEPT VISIT LOW MDM: CPT

## 2024-06-22 ASSESSMENT — ACTIVITIES OF DAILY LIVING (ADL)
ADLS_ACUITY_SCORE: 35

## 2024-06-22 ASSESSMENT — COLUMBIA-SUICIDE SEVERITY RATING SCALE - C-SSRS
6. HAVE YOU EVER DONE ANYTHING, STARTED TO DO ANYTHING, OR PREPARED TO DO ANYTHING TO END YOUR LIFE?: NO
2. HAVE YOU ACTUALLY HAD ANY THOUGHTS OF KILLING YOURSELF IN THE PAST MONTH?: NO
1. IN THE PAST MONTH, HAVE YOU WISHED YOU WERE DEAD OR WISHED YOU COULD GO TO SLEEP AND NOT WAKE UP?: NO

## 2024-06-22 NOTE — LETTER
June 22, 2024      To Whom It May Concern:      Sue De La Fuente was seen in our Emergency Department today, 06/22/24.  I expect her condition to improve over the next 3 days.  She may return to work when improved and perform non-strenuous activity and only activity as tolerated.     Sincerely,        RICHARD Dewey

## 2024-06-22 NOTE — ED NOTES
Updated pt we are just waiting on imaging results. Denies any needs at this time. Call light within reach.

## 2024-06-22 NOTE — ED TRIAGE NOTES
"Pt complains of 8/10 R ankle pain yesterday after she slipped out of her truck.  Pt states \"My ankle folded like a piece of paper\".  Pt reports it feels like a golf ball under her ankle bone and some burning on the bottom of her foot, ankle also feels like it will give out while walking.  Ibuprofen at home has not beed adequate for pain relief.     Triage Assessment (Adult)       Row Name 06/22/24 [id number]          Triage Assessment    Airway WDL WDL        Respiratory WDL    Respiratory WDL WDL        Skin Circulation/Temperature WDL    Skin Circulation/Temperature WDL WDL        Cardiac WDL    Cardiac WDL WDL        Peripheral/Neurovascular WDL    Peripheral Neurovascular WDL WDL        Cognitive/Neuro/Behavioral WDL    Cognitive/Neuro/Behavioral WDL WDL                     "

## 2024-06-23 NOTE — DISCHARGE INSTRUCTIONS
Get plenty of fluids and rest.  Your x-rays of foot and ankle were negative for acute fractures.  Therefore, your presentation is most consistent with an ankle sprain, use rest, ice, compression, elevation, use your crutches and weight-bear as tolerated.  With a bad sprain I would expect symptoms to gradually improve over the next 4 to 6 weeks.  Follow-up with PCP for reassessment, return to ED if worsening.Alternate Tylenol ibuprofen every 4 hours.

## 2024-06-24 ASSESSMENT — ENCOUNTER SYMPTOMS
ABDOMINAL PAIN: 0
CHILLS: 0
HEMATURIA: 0
COUGH: 0
SHORTNESS OF BREATH: 0
FEVER: 0

## 2024-06-24 NOTE — ED PROVIDER NOTES
"  History     Chief Complaint   Patient presents with    Ankle Pain     HPI  Sue De La Fuente is a 33 year old female who presents to the ED for evaluation of ankle pain. Pt complains of 8/10 R ankle pain yesterday after she slipped out of her truck.  Pt states \"My ankle folded like a piece of paper\".  Pt reports it feels like a golf ball under her ankle bone and some burning on the bottom of her foot, ankle also feels like it will give out while walking.  Ibuprofen at home has not beed adequate for pain relief.     Allergies:  Allergies   Allergen Reactions    Sulfa Antibiotics Anaphylaxis    Cats     Dogs     Sumatriptan      Side effects from sumatriptan compounded by using it with her Celexa and Lamictal which may increase blood levels and serotonin effect.  Stay off the sumatriptin at least until she talks to her provider. If they want to use it again, may have to consider a lower dose.        Problem List:    Patient Active Problem List    Diagnosis Date Noted    Accident while engaged in work-related activity 06/20/2024     Priority: Medium    Strain of left shoulder, initial encounter 06/20/2024     Priority: Medium    Subacromial impingement of left shoulder 06/20/2024     Priority: Medium    Carpal tunnel syndrome of right wrist 05/26/2016     Priority: Medium    Encounter for initial prescription of injectable contraceptive 03/29/2016     Priority: Medium    Post partum depression 01/21/2016     Priority: Medium    Vaginal yeast infection 10/01/2015     Priority: Medium    Recurrent umbilical hernia 08/05/2015     Priority: Medium    Headache 03/28/2012     Priority: Medium        Past Medical History:    Past Medical History:   Diagnosis Date    Headache        Past Surgical History:    Past Surgical History:   Procedure Laterality Date    OTHER SURGICAL HISTORY      2009,,HERNIA REPAIR    OTHER SURGICAL HISTORY      11/17/15,,HERNIA REPAIR,[facility] with mesh underlay       Family History:    No " "family history on file.    Social History:  Marital Status:   [2]  Social History     Tobacco Use    Smoking status: Every Day     Current packs/day: 0.50     Average packs/day: 0.5 packs/day for 10.0 years (5.0 ttl pk-yrs)     Types: Cigarettes    Smokeless tobacco: Former     Types: Chew   Vaping Use    Vaping status: Never Used   Substance Use Topics    Alcohol use: Yes     Alcohol/week: 5.0 standard drinks of alcohol     Types: 5 Cans of beer per week    Drug use: No        Medications:    buPROPion (WELLBUTRIN XL) 300 MG 24 hr tablet  cyclobenzaprine (FLEXERIL) 10 MG tablet  polyethylene glycol (MIRALAX) 17 g packet          Review of Systems   Constitutional:  Negative for chills and fever.   HENT:  Negative for congestion.    Eyes:  Negative for visual disturbance.   Respiratory:  Negative for cough and shortness of breath.    Cardiovascular:  Negative for chest pain.   Gastrointestinal:  Negative for abdominal pain.   Genitourinary:  Negative for hematuria.   Musculoskeletal:         Right ankle pain   Allergic/Immunologic: Negative for immunocompromised state.   Neurological:  Negative for syncope.       Physical Exam   BP: 121/77  Pulse: 85  Temp: 98.3  F (36.8  C)  Resp: 16  Height: 157.5 cm (5' 2\")  Weight: 72.6 kg (160 lb)  SpO2: 99 %      Physical Exam  Constitutional:       General: She is not in acute distress.     Appearance: She is well-developed. She is not diaphoretic.   HENT:      Head: Normocephalic and atraumatic.   Eyes:      General: No scleral icterus.     Conjunctiva/sclera: Conjunctivae normal.   Neck:      Vascular: No carotid bruit.   Cardiovascular:      Rate and Rhythm: Normal rate and regular rhythm.   Pulmonary:      Effort: Pulmonary effort is normal.      Breath sounds: Normal breath sounds.   Abdominal:      Palpations: Abdomen is soft.      Tenderness: There is no abdominal tenderness.   Musculoskeletal:         General: Tenderness present. No deformity.      Cervical " back: Neck supple.      Right lower leg: No edema.      Left lower leg: No edema.      Comments: Lateral right ankle TTP, extends into right side of foot.   Skin:     General: Skin is warm and dry.      Coloration: Skin is not jaundiced.      Findings: No rash.   Neurological:      Mental Status: She is alert. Mental status is at baseline.   Psychiatric:         Mood and Affect: Mood normal.         Behavior: Behavior normal.         ED Course        Procedures              Critical Care time:  none               No results found for this or any previous visit (from the past 24 hour(s)).    Medications - No data to display    Assessments & Plan (with Medical Decision Making)   Nontoxic and in NAD. She does have some Lateral right ankle TTP, extends into right side of foot.  She has good right lower extremity DP/PT pulses.  Imaging of right ankle and foot negative for acute findings.  She has a negative high ankle squeeze test, no discomfort in her right knee area or anywhere else.    From discharge paperwork:  Your x-rays of foot and ankle were negative for acute fractures.  Therefore, your presentation is most consistent with an ankle sprain, use rest, ice, compression, elevation, use your crutches and weight-bear as tolerated.  With a bad sprain I would expect symptoms to gradually improve over the next 4 to 6 weeks.  Follow-up with PCP for reassessment, return to ED if worsening.Alternate Tylenol ibuprofen every 4 hours.    Strict return precautions are given to the pt, they will return if symptoms are worsening or concerning. The pt understands and agrees with the plan and they are discharged.     Bonifacio Angel PA-C    I have reviewed the nursing notes.    I have reviewed the findings, diagnosis, plan and need for follow up with the patient.          Discharge Medication List as of 6/22/2024  8:51 PM          Final diagnoses:   Right ankle sprain   Right foot pain       6/22/2024   [facility] AND  [facility] IslandBonifacio, PA  06/24/24 [id number]

## 2024-06-25 ENCOUNTER — THERAPY VISIT (OUTPATIENT)
Dept: PHYSICAL THERAPY | Facility: OTHER | Age: 34
End: 2024-06-25
Attending: FAMILY MEDICINE
Payer: OTHER MISCELLANEOUS

## 2024-06-25 DIAGNOSIS — S46.912A STRAIN OF LEFT SHOULDER, INITIAL ENCOUNTER: ICD-10-CM
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
## 2024-06-25 DIAGNOSIS — M75.42 SUBACROMIAL IMPINGEMENT OF LEFT SHOULDER: ICD-10-CM

## 2024-06-25 PROCEDURE — 97035 APP MDLTY 1+ULTRASOUND EA 15: CPT | Mod: GP

## 2024-06-25 PROCEDURE — 97110 THERAPEUTIC EXERCISES: CPT | Mod: GP

## 2024-06-27 ENCOUNTER — THERAPY VISIT (OUTPATIENT)
Dept: PHYSICAL THERAPY | Facility: OTHER | Age: 34
End: 2024-06-27
Attending: FAMILY MEDICINE
Payer: OTHER MISCELLANEOUS

## 2024-06-27 DIAGNOSIS — S46.912A STRAIN OF LEFT SHOULDER, INITIAL ENCOUNTER: ICD-10-CM
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
## 2024-06-27 DIAGNOSIS — M75.42 SUBACROMIAL IMPINGEMENT OF LEFT SHOULDER: ICD-10-CM

## 2024-06-27 PROCEDURE — 97035 APP MDLTY 1+ULTRASOUND EA 15: CPT | Mod: GP

## 2024-06-27 PROCEDURE — 97110 THERAPEUTIC EXERCISES: CPT | Mod: GP

## 2024-07-03 ENCOUNTER — THERAPY VISIT (OUTPATIENT)
Dept: PHYSICAL THERAPY | Facility: OTHER | Age: 34
End: 2024-07-03
Attending: FAMILY MEDICINE
Payer: OTHER MISCELLANEOUS

## 2024-07-03 DIAGNOSIS — M75.42 SUBACROMIAL IMPINGEMENT OF LEFT SHOULDER: ICD-10-CM
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
## 2024-07-03 DIAGNOSIS — S46.912A STRAIN OF LEFT SHOULDER, INITIAL ENCOUNTER: ICD-10-CM

## 2024-07-03 PROCEDURE — 97110 THERAPEUTIC EXERCISES: CPT | Mod: GP

## 2024-07-03 PROCEDURE — 97140 MANUAL THERAPY 1/> REGIONS: CPT | Mod: GP

## 2024-07-03 PROCEDURE — 97035 APP MDLTY 1+ULTRASOUND EA 15: CPT | Mod: GP

## 2024-07-08 ENCOUNTER — OFFICE VISIT (OUTPATIENT)
Dept: FAMILY MEDICINE | Facility: OTHER | Age: 34
End: 2024-07-08
Attending: FAMILY MEDICINE
Payer: OTHER MISCELLANEOUS

## 2024-07-08 ENCOUNTER — THERAPY VISIT (OUTPATIENT)
Dept: PHYSICAL THERAPY | Facility: OTHER | Age: 34
End: 2024-07-08
Attending: FAMILY MEDICINE
Payer: OTHER MISCELLANEOUS

## 2024-07-08 VITALS
DIASTOLIC BLOOD PRESSURE: 64 MMHG | RESPIRATION RATE: 16 BRPM | HEART RATE: 93 BPM | WEIGHT: 170.8 LBS | BODY MASS INDEX: 31.24 KG/M2 | TEMPERATURE: 98.2 F | OXYGEN SATURATION: 98 % | SYSTOLIC BLOOD PRESSURE: 118 MMHG

## 2024-07-08 DIAGNOSIS — M75.42 SUBACROMIAL IMPINGEMENT OF LEFT SHOULDER: ICD-10-CM

## 2024-07-08 DIAGNOSIS — S46.912A STRAIN OF LEFT SHOULDER, INITIAL ENCOUNTER: ICD-10-CM
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
## 2024-07-08 PROCEDURE — 97110 THERAPEUTIC EXERCISES: CPT | Mod: GP

## 2024-07-08 PROCEDURE — 97140 MANUAL THERAPY 1/> REGIONS: CPT | Mod: GP

## 2024-07-08 PROCEDURE — 99213 OFFICE O/P EST LOW 20 MIN: CPT | Performed by: FAMILY MEDICINE

## 2024-07-08 PROCEDURE — 97035 APP MDLTY 1+ULTRASOUND EA 15: CPT | Mod: GP

## 2024-07-08 ASSESSMENT — PAIN SCALES - GENERAL: PAINLEVEL: MODERATE PAIN (4)

## 2024-07-08 NOTE — PROGRESS NOTES
Sports Medicine Office Note    HPI:  33-year-old female coming in for follow-up evaluation of left shoulder pain.  Her pain has been present for approximately 1 year, maybe slightly longer.  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  She did 2 weeks of anti-inflammatories and return to work without restriction.  She does not feel like she completely healed from this injury.  She was rolling a patient again at work on 4/24 and felt a pop in her shoulder.  She has been dealing with significant pain in that shoulder since this injury.  She was seen in this office on 5/15.  She was referred to physical therapy.  She has had 4 sessions of therapy and feels like this is going well.  She is also doing home exercises on a regular basis.  She has therapy scheduled out through the end of August.  Her current pain is 4-7/10.  She characterized the pain as sharp, stabbing, aching, and throbbing.  She is still using ibuprofen to help with her symptoms.      EXAM:  /64 (BP Location: Right arm, Patient Position: Sitting, Cuff Size: Adult Regular)   Pulse 93   Temp 98.2  F (36.8  C) (Temporal)   Resp 16   Wt 77.5 kg (170 lb 12.8 oz)   SpO2 98%   BMI 31.24 kg/m    MUSCULOSKELETAL EXAM:  LEFT SHOULDER  Inspection:  -No gross deformity    Tenderness to palpation of the:  -AC joint: Positive  -Clavicle:  Negative  -Biceps tendon in bicipital groove: Mild pain  -Deltoid musculature: Mild pain  -Upper trapezius musculature: Positive    Range of Motion:  -Active flexion:  180 left, 180 right  -Active abduction:  100 left (180 passively), 180 right    Strength:  -Supraspinatus:  5/5  -Infraspinatus:  5/5  -Subscapularis:  5/5  -Deltoid:  5/5    Special Tests:  -Maurice test:  Negative  -Moore test: Positive  -Mid-arc pain: Present  -Lag sign:  Negative    Other:  -Intact sensation to light touch distally.  -No signs of cyanosis. Normal skin temperature of the upper extremity.  -Elbow:  No gross  deformity. Full range of motion.  -Hand/wrist:  No gross deformity. Full range of motion.  -Right shoulder:  No gross deformity. No palpable tenderness. Normal strength.      IMAGIN2024: 3 view left shoulder x-ray  - No fracture, dislocation, or bony lesion      ASSESSMENT/PLAN:  Diagnoses and all orders for this visit:  Accident while engaged in work-related activity  Subacromial impingement of left shoulder  Strain of left shoulder, initial encounter    33-year-old female with subacromial impingement of the left shoulder.  X-rays from 2024 were again personally reviewed in the office with the findings as demonstrated above by my interpretation.  She is progressing, though slowly, with physical therapy.  She is also taking OTC NSAIDs.  As she is making progress we will continue with the current treatment plan.  - Continue with therapy, reemphasized the importance of home therapy  - Continue OTC NSAIDs  - Discussed the use of cyclobenzaprine, patient does not feel like it is helping, we will not refill at this time  - As symptoms are improving we will hold off on more invasive interventions such as a CSI though this could be considered in the future  - Workability form filled out  - Follow-up in 6-8 weeks for repeat evaluation and updated work accommodations  - If symptoms are not continuing to improve with therapy, consider subacromial CSI and/or MRI      Toño Garcia MD  2024  11:12 AM    Total time spent with this patient was 17 minutes which included chart review, visualization and independent interpretation of images, time spent with the patient, and documentation.    Procedure time:  0 minute(s)

## 2024-07-11 ENCOUNTER — THERAPY VISIT (OUTPATIENT)
Dept: PHYSICAL THERAPY | Facility: OTHER | Age: 34
End: 2024-07-11
Attending: FAMILY MEDICINE
Payer: OTHER MISCELLANEOUS

## 2024-07-11 DIAGNOSIS — M75.42 SUBACROMIAL IMPINGEMENT OF LEFT SHOULDER: ICD-10-CM
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
## 2024-07-11 DIAGNOSIS — S46.912A STRAIN OF LEFT SHOULDER, INITIAL ENCOUNTER: ICD-10-CM

## 2024-07-11 PROCEDURE — 97110 THERAPEUTIC EXERCISES: CPT | Mod: GP

## 2024-07-11 PROCEDURE — 97035 APP MDLTY 1+ULTRASOUND EA 15: CPT | Mod: GP

## 2024-07-11 PROCEDURE — 97140 MANUAL THERAPY 1/> REGIONS: CPT | Mod: GP

## 2024-07-11 NOTE — PROGRESS NOTES
PLAN  Continue therapy per current plan of care. Dr. Garcia extended work restrictions for another 8 weeks. Slow recovery due to patient's work not adhering to her work restrictions.    Beginning/End Dates of Progress Note Reporting Period:  06/20/24 to 07/11/2024    Referring Provider:  Toño Garcia       07/11/24 0500   Appointment Info   Signing clinician's name / credentials Jessica Brown, PT, DPT   Total/Authorized Visits 6 of 9   Visits Used 6   Medical Diagnosis Accident while engaged in work-related activity (Y99.0), Strain of left shoulder, initial encounter (S46.912A), Subacromial impingement of left shoulder (M75.42)   Progress Note/Certification   Start of Care Date 06/20/24   Onset of illness/injury or Date of Surgery 04/24/24   Therapy Frequency 1-2x per week   Predicted Duration 12 weeks   Progress Note Completed Date 06/20/24   PT Goal 1   Goal Identifier Pain   Goal Description Patient will report pain no greater then 3/10 at night in order to sleep for 6 consecutive hours in the next 4 weeks.   Rationale to maximize safety and independence with performance of ADLs and functional tasks;to maximize safety and independence within the home;to maximize safety and independence with self cares   Goal Progress in progress   Target Date 07/18/24   PT Goal 2   Goal Identifier ROM   Goal Description Patient will be able to demonstrate full pain free AROM flexion and abduction of 180* in order to complete ADLs in the next 8 weeks.   Rationale to maximize safety and independence with performance of ADLs and functional tasks;to maximize safety and independence within the home;to maximize safety and independence with self cares   Target Date 08/15/24   Goal Progress in progress   PT Goal 3   Goal Identifier Strength   Goal Description Patient will demonstrate lifting 50lbs 10x's without increased pain or swelling in order to return to work without restrictions in the next 12 weeks.   Rationale to maximize  safety and independence with performance of ADLs and functional tasks;to maximize safety and independence within the home;to maximize safety and independence with self cares   Target Date 09/12/24   Goal Progress in progress   Subjective Report   Subjective Report Sue reports that her arm is really sore. She states that they had Sue lift a patient  without assistance. She had to take off her rings due to swelling. Sue reports numbness and tingling from mid bicep to elbow. Patient reports compliance with HEP. Patient rates pain as 7/10.   Objective Measures   Objective Measures Objective Measure 1;Objective Measure 2;Objective Measure 3;Objective Measure 4   Objective Measure 1   Objective Measure Strength   Details Left shoulder flexion, abduction, IR and ER are 4-/5 with muscle guarding   Objective Measure 2   Objective Measure ROM   Details AROM left flexion 115* and abduction 90*. PROM left flexion 140*, abduction 110*, IR 40*, and ER 70*   Objective Measure 3   Objective Measure Palpation   Details Pain with palpation over left biceps tendon as well as posterior aspect of shoulder (inraspinatus, teres minor and major). Pain with palpation into pec muscles on the left.   Objective Measure 4   Objective Measure Special Tests   Details + Empty can, evans carlotta, painful arc on the left   Ultrasound   Ultrasound Minutes (11093) 10   Ultrasound -Type (does not include 3-5 min prep/cleanup time) Continuous;5 cm sound head   Intensity 1.0W/cm2   Duration (does not include the 3-5 min set up/clean up time) 7 min   Frequency 1 MHz   Location left shoulder   Positioning sitting   Patient Response/Progress Patient tolerated well.   Treatment Interventions (PT)   Interventions Therapeutic Procedure/Exercise   Therapeutic Procedure/Exercise   Therapeutic Procedures: strength, endurance, ROM, flexibility minutes (08327) 10   Ther Proc 1 UBE level 1 for 8 minutes forward   Ther Proc 1 - Details Warm-up    Skilled Intervention Adjusted resistance for appropriate warm-up.   Patient Response/Progress Patient tolerated well.   Manual Therapy   Manual Therapy: Mobilization, MFR, MLD, friction massage minutes (62243) 20   Manual Therapy 1 STM and PROM   Manual Therapy 1 - Details STM to left supraspinatus, infraspinatus, rhomboid, latissimus dorsi and subscapularis muscles targeting areas of increased muscle tension.   Skilled Intervention STM and PROM in order to decrease pain and improve mobility   Education   Learner/Method Patient;Listening;Reading;Demonstration;Pictures/Video;No Barriers to Learning   Plan   Home program HEP with visual and written instruction   Plan for next session Work on improving mobility and strength. Ultrasound if needed.   Comments   Comments Patient is a 33 year old female presenting to the clinic with left shoulder pain. Patient reports that she was rolling a resident at work and felt a pop with immediate pain. Patient reports a similar injury that happened in January that had healed prior to April injury. Patient presents today reporting difficulties with pain, weight bearing, swelling, stiffness, burning, numbness, tingling, lifting, and locking/catching of joint. Patient describes pain as sharp, dull, aching, burning and stabbing. Patient reports constant pain with an intensity that varies. Pain is worse with lifting, reaching, ADLs, IADLs, work tasks and certain positions. Pain is better with rest, certain positions, heat, cold, and OTC medication. 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 Patient reports that arm will spasm and swelling if lifting more then 20 lbs. If she moves her arm for normal activities she reports feeling a clicking/popping sensation. She states she has inconsistent numbness and tingling in the shoulder. Most of pain is located in the posterior aspect of the shoulder. The only comfortable position she is able to sleep in  is on her side with left arm laying on a body pillow.   Total Session Time   Timed Code Treatment Minutes 40   Total Treatment Time (sum of timed and untimed services) 40

## 2024-07-23 ENCOUNTER — THERAPY VISIT (OUTPATIENT)
Dept: PHYSICAL THERAPY | Facility: OTHER | Age: 34
End: 2024-07-23
Attending: FAMILY MEDICINE
Payer: OTHER MISCELLANEOUS
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
## 2024-07-23 DIAGNOSIS — S46.912A STRAIN OF LEFT SHOULDER, INITIAL ENCOUNTER: ICD-10-CM

## 2024-07-23 DIAGNOSIS — M75.42 SUBACROMIAL IMPINGEMENT OF LEFT SHOULDER: ICD-10-CM

## 2024-07-23 PROCEDURE — 97035 APP MDLTY 1+ULTRASOUND EA 15: CPT | Mod: GP,CQ

## 2024-07-23 PROCEDURE — 97140 MANUAL THERAPY 1/> REGIONS: CPT | Mod: GP,CQ

## 2024-07-25 ENCOUNTER — THERAPY VISIT (OUTPATIENT)
Dept: PHYSICAL THERAPY | Facility: OTHER | Age: 34
End: 2024-07-25
Attending: FAMILY MEDICINE
Payer: OTHER MISCELLANEOUS

## 2024-07-25 DIAGNOSIS — M75.42 SUBACROMIAL IMPINGEMENT OF LEFT SHOULDER: ICD-10-CM
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
## 2024-07-25 DIAGNOSIS — S46.912A STRAIN OF LEFT SHOULDER, INITIAL ENCOUNTER: ICD-10-CM

## 2024-07-25 PROCEDURE — 97110 THERAPEUTIC EXERCISES: CPT | Mod: GP,CQ

## 2024-07-25 PROCEDURE — 97035 APP MDLTY 1+ULTRASOUND EA 15: CPT | Mod: GP,CQ

## 2024-07-25 PROCEDURE — 97140 MANUAL THERAPY 1/> REGIONS: CPT | Mod: GP,CQ

## 2024-08-01 ENCOUNTER — MYC MEDICAL ADVICE (OUTPATIENT)
Dept: FAMILY MEDICINE | Facility: OTHER | Age: 34
End: 2024-08-01
Payer: COMMERCIAL

## 2024-08-01 ENCOUNTER — THERAPY VISIT (OUTPATIENT)
Dept: PHYSICAL THERAPY | Facility: OTHER | Age: 34
End: 2024-08-01
Attending: FAMILY MEDICINE
Payer: OTHER MISCELLANEOUS

## 2024-08-01 DIAGNOSIS — S46.912A STRAIN OF LEFT SHOULDER, INITIAL ENCOUNTER: ICD-10-CM
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
## 2024-08-01 DIAGNOSIS — M75.42 SUBACROMIAL IMPINGEMENT OF LEFT SHOULDER: ICD-10-CM

## 2024-08-01 PROCEDURE — 97110 THERAPEUTIC EXERCISES: CPT | Mod: GP

## 2024-08-01 PROCEDURE — 97140 MANUAL THERAPY 1/> REGIONS: CPT | Mod: GP

## 2024-08-01 NOTE — TELEPHONE ENCOUNTER
LOV 7/8/24  33-year-old female with subacromial impingement of the left shoulder.  X-rays from January 2024 were again personally reviewed in the office with the findings as demonstrated above by my interpretation.  She is progressing, though slowly, with physical therapy.  She is also taking OTC NSAIDs.  As she is making progress we will continue with the current treatment plan.  - Continue with therapy, reemphasized the importance of home therapy  - Continue OTC NSAIDs  - Discussed the use of cyclobenzaprine, patient does not feel like it is helping, we will not refill at this time  - As symptoms are improving we will hold off on more invasive interventions such as a CSI though this could be considered in the future  - Workability form filled out  - Follow-up in 6-8 weeks for repeat evaluation and updated work accommodations  - If symptoms are not continuing to improve with therapy, consider subacromial CSI and/or MRI    F/U appt 9/3/24

## 2024-08-05 NOTE — TELEPHONE ENCOUNTER
Patient called and states that she will call her work comp .   Patient lakshmi keep us updated  Sue Lim LPN.......8/5/2024 8:36 AM

## 2024-08-05 NOTE — TELEPHONE ENCOUNTER
Please call this patient and clarify if she is agreeing to contacting her Workmen's Comp.  or agreeing to have me change her lifting restrictions.  Thanks.

## 2024-08-12 ENCOUNTER — THERAPY VISIT (OUTPATIENT)
Dept: PHYSICAL THERAPY | Facility: OTHER | Age: 34
End: 2024-08-12
Attending: FAMILY MEDICINE
Payer: OTHER MISCELLANEOUS
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
## 2024-08-12 DIAGNOSIS — S46.912A STRAIN OF LEFT SHOULDER, INITIAL ENCOUNTER: ICD-10-CM

## 2024-08-12 DIAGNOSIS — M75.42 SUBACROMIAL IMPINGEMENT OF LEFT SHOULDER: ICD-10-CM

## 2024-08-12 PROCEDURE — 97035 APP MDLTY 1+ULTRASOUND EA 15: CPT | Mod: GP

## 2024-08-12 PROCEDURE — 97110 THERAPEUTIC EXERCISES: CPT | Mod: GP

## 2024-08-12 PROCEDURE — 97016 VASOPNEUMATIC DEVICE THERAPY: CPT | Mod: GP

## 2024-08-14 ENCOUNTER — THERAPY VISIT (OUTPATIENT)
Dept: PHYSICAL THERAPY | Facility: OTHER | Age: 34
End: 2024-08-14
Attending: FAMILY MEDICINE
Payer: OTHER MISCELLANEOUS

## 2024-08-14 DIAGNOSIS — M75.42 SUBACROMIAL IMPINGEMENT OF LEFT SHOULDER: ICD-10-CM

## 2024-08-14 DIAGNOSIS — S46.912A STRAIN OF LEFT SHOULDER, INITIAL ENCOUNTER: ICD-10-CM
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
## 2024-08-14 PROCEDURE — 97016 VASOPNEUMATIC DEVICE THERAPY: CPT | Mod: GP,CQ

## 2024-08-14 PROCEDURE — 97035 APP MDLTY 1+ULTRASOUND EA 15: CPT | Mod: GP,CQ

## 2024-08-15 NOTE — PROGRESS NOTES
PLAN  Continue therapy per current plan of care. Patient is progressing very slowly due to work not following restrictions.     Beginning/End Dates of Progress Note Reporting Period:  06/20/24 to 08/12/2024    Referring Provider:  Toño Garcia       08/12/24 4090   Appointment Info   Signing clinician's name / credentials Jessica Brown, PT, DPT   Total/Authorized Visits 10 of 15   Visits Used 10   Medical Diagnosis Accident while engaged in work-related activity (Y99.0), Strain of left shoulder, initial encounter (S46.912A), Subacromial impingement of left shoulder (M75.42)   Progress Note/Certification   Start of Care Date 06/20/24   Onset of illness/injury or Date of Surgery 04/24/24   Therapy Frequency 1-2x per week   Predicted Duration 12 weeks   Progress Note Completed Date 06/20/24   Supervision   PT Assistant Visit Number 2   PT Goal 1   Goal Identifier Pain   Goal Description Patient will report pain no greater then 3/10 at night in order to sleep for 6 consecutive hours in the next 4 weeks.   Rationale to maximize safety and independence with performance of ADLs and functional tasks;to maximize safety and independence within the home;to maximize safety and independence with self cares   Goal Progress in progress   Target Date 07/18/24   PT Goal 2   Goal Identifier ROM   Goal Description Patient will be able to demonstrate full pain free AROM flexion and abduction of 180* in order to complete ADLs in the next 8 weeks.   Rationale to maximize safety and independence with performance of ADLs and functional tasks;to maximize safety and independence within the home;to maximize safety and independence with self cares   Goal Progress in progress   Target Date 08/15/24   PT Goal 3   Goal Identifier Strength   Goal Description Patient will demonstrate lifting 50lbs 10x's without increased pain or swelling in order to return to work without restrictions in the next 12 weeks.   Rationale to maximize safety and  independence with performance of ADLs and functional tasks;to maximize safety and independence within the home;to maximize safety and independence with self cares   Goal Progress in progress   Target Date 09/12/24   Subjective Report   Subjective Report Sue reports that her ROM is getting better but pain is about the same. Patient reports that back of the shoulder is very sensitive to the touch. Patient is compliant with HEP. Patient reports work continues to not follow her restrictions. She states that she walked out of work due to them not following restrictions. Patient reports pain is 7/10. Patient does feel ultrasound is helping.   Objective Measures   Objective Measures Objective Measure 1;Objective Measure 2;Objective Measure 3;Objective Measure 4   Objective Measure 1   Objective Measure Strength   Details Left shoulder flexion, abduction, IR and ER are 4-/5 with muscle guarding   Objective Measure 2   Objective Measure ROM   Details AROM left flexion 115* and abduction 90*. PROM left flexion 140*, abduction 110*, IR 40*, and ER 70*   Objective Measure 3   Objective Measure Palpation   Details Pain with palpation over left biceps tendon as well as posterior aspect of shoulder (inraspinatus, teres minor and major). Pain with palpation into pec muscles on the left.   Objective Measure 4   Objective Measure Special Tests   Details + Empty can, evans carlotta, painful arc on the left   Ultrasound   Ultrasound Minutes (10745) 10   Ultrasound -Type (does not include 3-5 min prep/cleanup time) Continuous;5 cm sound head   Intensity 1.0W/cm2   Duration (does not include the 3-5 min set up/clean up time) 8 min   Frequency 1 MHz   Location left shoulder   Positioning sitting   Patient Response/Progress Patient tolerated well.   Vasopneumatic Device   Vasopneumatic device minutes (55189) 15   Treatment Detail Patient in sitting. Educated on Nice machine and its benefits. Patient is agreeable.   Duration 10 min    Temperature 3/5 with moderate compression   Treatment Interventions (PT)   Interventions Therapeutic Procedure/Exercise   Therapeutic Procedure/Exercise   Therapeutic Procedures: strength, endurance, ROM, flexibility minutes (44711) 15   Ther Proc 1 UBE level 1 for 5 minutes forward and backwards.   Ther Proc 1 - Details Warm-up.   Skilled Intervention Adjusting resistance for appropriate warm-up   Patient Response/Progress Patient tolerated well; reported increased soreness - stopped at 5 mins   Manual Therapy   Manual Therapy 1 STM and PROM   Manual Therapy 1 - Details Hot pack applied to shoulder to warm it up for 5 minutes while patient provided subjective. STM to left supraspinatus, infraspinatus, rhomboid, latissimus dorsi and subscapularis muscles targeting areas of increased muscle tension with pt sitting in chair w/ arm rests.   Skilled Intervention STM and PROM in order to decrease pain and improve mobility   Patient Response/Progress tolerated well   Education   Learner/Method Patient;Listening;Reading;Demonstration;Pictures/Video;No Barriers to Learning   Plan   Home program HEP with visual and written instruction   Plan for next session Continue to progress ROM/strength exercises as able per POC, communicate with PT as needed.   Comments   Comments Patient is a 33 year old female presenting to the clinic with left shoulder pain. Patient reports that she was rolling a resident at work and felt a pop with immediate pain. Patient reports a similar injury that happened in January that had healed prior to April injury. Patient presents today reporting difficulties with pain, weight bearing, swelling, stiffness, burning, numbness, tingling, lifting, and locking/catching of joint. Patient describes pain as sharp, dull, aching, burning and stabbing. Patient reports constant pain with an intensity that varies. Pain is worse with lifting, reaching, ADLs, IADLs, work tasks and certain positions. Pain is better  with rest, certain positions, heat, cold, and OTC medication. 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 Patient reports that arm will spasm and swelling if lifting more then 20 lbs. If she moves her arm for normal activities she reports feeling a clicking/popping sensation. She states she has inconsistent numbness and tingling in the shoulder. Most of pain is located in the posterior aspect of the shoulder. The only comfortable position she is able to sleep in is on her side with left arm laying on a body pillow.   Total Session Time   Timed Code Treatment Minutes 25   Total Treatment Time (sum of timed and untimed services) 40

## 2024-08-19 ENCOUNTER — THERAPY VISIT (OUTPATIENT)
Dept: PHYSICAL THERAPY | Facility: OTHER | Age: 34
End: 2024-08-19
Attending: FAMILY MEDICINE
Payer: OTHER MISCELLANEOUS

## 2024-08-19 DIAGNOSIS — S46.912A STRAIN OF LEFT SHOULDER, INITIAL ENCOUNTER: ICD-10-CM

## 2024-08-19 DIAGNOSIS — M75.42 SUBACROMIAL IMPINGEMENT OF LEFT SHOULDER: ICD-10-CM
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
## 2024-08-19 PROCEDURE — 97035 APP MDLTY 1+ULTRASOUND EA 15: CPT | Mod: GP

## 2024-08-19 PROCEDURE — 97016 VASOPNEUMATIC DEVICE THERAPY: CPT | Mod: GP

## 2024-08-28 ENCOUNTER — THERAPY VISIT (OUTPATIENT)
Dept: PHYSICAL THERAPY | Facility: OTHER | Age: 34
End: 2024-08-28
Attending: FAMILY MEDICINE
Payer: OTHER MISCELLANEOUS

## 2024-08-28 DIAGNOSIS — S46.912A STRAIN OF LEFT SHOULDER, INITIAL ENCOUNTER: ICD-10-CM
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
## 2024-08-28 DIAGNOSIS — M75.42 SUBACROMIAL IMPINGEMENT OF LEFT SHOULDER: ICD-10-CM

## 2024-08-28 PROCEDURE — 97016 VASOPNEUMATIC DEVICE THERAPY: CPT | Mod: GP

## 2024-08-28 PROCEDURE — 97035 APP MDLTY 1+ULTRASOUND EA 15: CPT | Mod: GP

## 2024-08-28 PROCEDURE — 97140 MANUAL THERAPY 1/> REGIONS: CPT | Mod: GP

## 2024-08-30 ENCOUNTER — THERAPY VISIT (OUTPATIENT)
Dept: PHYSICAL THERAPY | Facility: OTHER | Age: 34
End: 2024-08-30
Attending: FAMILY MEDICINE
Payer: OTHER MISCELLANEOUS
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
## 2024-08-30 DIAGNOSIS — M75.42 SUBACROMIAL IMPINGEMENT OF LEFT SHOULDER: ICD-10-CM

## 2024-08-30 DIAGNOSIS — S46.912A STRAIN OF LEFT SHOULDER, INITIAL ENCOUNTER: ICD-10-CM

## 2024-08-30 PROCEDURE — 97140 MANUAL THERAPY 1/> REGIONS: CPT | Mod: GP

## 2024-08-30 PROCEDURE — 97035 APP MDLTY 1+ULTRASOUND EA 15: CPT | Mod: GP

## 2024-08-30 PROCEDURE — 97016 VASOPNEUMATIC DEVICE THERAPY: CPT | Mod: GP

## 2024-09-04 ENCOUNTER — THERAPY VISIT (OUTPATIENT)
Dept: PHYSICAL THERAPY | Facility: OTHER | Age: 34
End: 2024-09-04
Attending: FAMILY MEDICINE
Payer: OTHER MISCELLANEOUS

## 2024-09-04 DIAGNOSIS — S46.912A STRAIN OF LEFT SHOULDER, INITIAL ENCOUNTER: ICD-10-CM
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
## 2024-09-04 DIAGNOSIS — M75.42 SUBACROMIAL IMPINGEMENT OF LEFT SHOULDER: ICD-10-CM

## 2024-09-04 PROCEDURE — 97016 VASOPNEUMATIC DEVICE THERAPY: CPT | Mod: GP

## 2024-09-04 PROCEDURE — 97110 THERAPEUTIC EXERCISES: CPT | Mod: GP

## 2024-09-04 PROCEDURE — 97035 APP MDLTY 1+ULTRASOUND EA 15: CPT | Mod: GP

## 2024-09-10 ENCOUNTER — OFFICE VISIT (OUTPATIENT)
Dept: FAMILY MEDICINE | Facility: OTHER | Age: 34
End: 2024-09-10
Attending: FAMILY MEDICINE
Payer: OTHER MISCELLANEOUS

## 2024-09-10 VITALS
DIASTOLIC BLOOD PRESSURE: 62 MMHG | OXYGEN SATURATION: 99 % | BODY MASS INDEX: 31.83 KG/M2 | WEIGHT: 174 LBS | HEART RATE: 81 BPM | SYSTOLIC BLOOD PRESSURE: 110 MMHG | TEMPERATURE: 97.4 F | RESPIRATION RATE: 16 BRPM

## 2024-09-10 DIAGNOSIS — M75.42 SUBACROMIAL IMPINGEMENT OF LEFT SHOULDER: ICD-10-CM
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
## 2024-09-10 DIAGNOSIS — F17.200 TOBACCO USE DISORDER: ICD-10-CM

## 2024-09-10 PROCEDURE — 20610 DRAIN/INJ JOINT/BURSA W/O US: CPT | Mod: LT | Performed by: FAMILY MEDICINE

## 2024-09-10 PROCEDURE — 99213 OFFICE O/P EST LOW 20 MIN: CPT | Mod: 25 | Performed by: FAMILY MEDICINE

## 2024-09-10 PROCEDURE — 250N000009 HC RX 250: Performed by: FAMILY MEDICINE

## 2024-09-10 PROCEDURE — 250N000011 HC RX IP 250 OP 636: Mod: JZ | Performed by: FAMILY MEDICINE

## 2024-09-10 RX ORDER — LIDOCAINE HYDROCHLORIDE 10 MG/ML
2 INJECTION, SOLUTION EPIDURAL; INFILTRATION; INTRACAUDAL; PERINEURAL ONCE
Status: COMPLETED | OUTPATIENT
Start: 2024-09-10 | End: 2024-09-10

## 2024-09-10 RX ORDER — BUPIVACAINE HYDROCHLORIDE 5 MG/ML
2 INJECTION, SOLUTION EPIDURAL; INTRACAUDAL ONCE
Status: COMPLETED | OUTPATIENT
Start: 2024-09-10 | End: 2024-09-10

## 2024-09-10 RX ORDER — DEXAMETHASONE SODIUM PHOSPHATE 10 MG/ML
10 INJECTION, SOLUTION INTRAMUSCULAR; INTRAVENOUS ONCE
Status: COMPLETED | OUTPATIENT
Start: 2024-09-10 | End: 2024-09-10

## 2024-09-10 RX ADMIN — BUPIVACAINE HYDROCHLORIDE 10 MG: 5 INJECTION, SOLUTION EPIDURAL; INTRACAUDAL; PERINEURAL at 08:13

## 2024-09-10 RX ADMIN — LIDOCAINE HYDROCHLORIDE 2 ML: 10 INJECTION, SOLUTION INFILTRATION; PERINEURAL at 08:13

## 2024-09-10 RX ADMIN — DEXAMETHASONE SODIUM PHOSPHATE 10 MG: 10 INJECTION, SOLUTION INTRAMUSCULAR; INTRAVENOUS at 08:14

## 2024-09-10 ASSESSMENT — PAIN SCALES - GENERAL: PAINLEVEL: MODERATE PAIN (4)

## 2024-09-10 NOTE — PROGRESS NOTES
Sports Medicine Office Note    HPI:  34-year-old female coming in for follow-up evaluation of left shoulder pain.  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  She continues to deal with pain.  She was seen in this office on 5/15.  She was referred to physical therapy.  She followed up in this office on 7/8.  Her symptoms had been slightly improving at that time.  She has been continuing to do therapy and continuing to notice slight improvement.  She rates her pain at 4/10.  She characterized the pain as dull, sharp, stabbing, and aching.  She has continued use ibuprofen and Tylenol to help with her symptoms.  Overhead lifting is still bothersome.      EXAM:  /62 (BP Location: Right arm, Patient Position: Sitting, Cuff Size: Adult Large)   Pulse 81   Temp 97.4  F (36.3  C) (Temporal)   Resp 16   Wt 78.9 kg (174 lb)   SpO2 99%   BMI 31.83 kg/m    MUSCULOSKELETAL EXAM:  LEFT SHOULDER  Inspection:  -No gross deformity  -No bruising or swelling  -Scars:  None    Tenderness to palpation of the:  -SC joint:  Negative  -AC joint:  Negative  -Clavicle:  Negative  -Biceps tendon in bicipital groove:  Negative  -Deltoid musculature:  Negative  -Upper trapezius musculature:  Negative    Range of Motion:  -Active flexion:  180 left, 180 right  -Active abduction:  170 left, 170 right    Strength:  -Supraspinatus:  5/5  -Infraspinatus:  5/5  -Subscapularis:  5/5  -Deltoid:  5/5    Special Tests:  -Maurice test:  Negative  -Moore test: Positive  -Mid-arc pain: Present  -Lag sign:  Negative    Other:  -Intact sensation to light touch distally.  -No signs of cyanosis. Normal skin temperature of the upper extremity.  -Elbow:  No gross deformity. Full range of motion.  -Hand/wrist:  No  gross deformity. Full range of motion.  -Right shoulder:  No gross deformity. No palpable tenderness. Normal strength.      IMAGIN2024: 3 view left shoulder x-ray  - No fracture, dislocation, or bony lesion      ASSESSMENT/PLAN:  Diagnoses and all orders for this visit:  Accident while engaged in work-related activity  Subacromial impingement of left shoulder  -     DRAIN/INJECT LARGE JOINT/BURSA  -     lidocaine (PF) (XYLOCAINE) 1 % injection 2 mL  -     dexAMETHasone PF (DECADRON) injection 10 mg  -     Bupivacaine 0.5% 2mL Infiltration    34-year-old female with subacromial impingement of the left shoulder.  X-rays from 2024 were again personally reviewed in the office with findings as demonstrated above by my interpretation.  She is progressing slowly with physical therapy.  We reviewed other treatment options which include continued activity modification, continued therapy, ice, topical medications, oral medications, injections, and surgery.  After reviewing the risks/benefits, the patient elects to proceed with a subacromial CSI.  See procedure note below:    PROCEDURE:  Subacromial Injection of the Left Shoulder      PROCEDURAL PAUSE:    A procedural pause was conducted to verify:  correct patient identity, procedure to be performed, and as applicable, correct side, site, and correct patient position.      INFORMED CONSENT:   I discussed the risks, possible benefits, and alternatives to injection.  Following denial of allergy and review of potential side effects and complications (including but not necessarily limited to infection, allergic reaction, fat necrosis, skin depigmentation, local tissue breakdown, systemic effects of corticosteroids, elevation of blood glucose, injury to soft tissue and/or nerves, and seizure), all questions were answered, and consent was given to proceed.  Patient verbalized understanding.      PROCEDURE DETAILS:    Side and site were marked, and a time out was  performed to verify appropriate patient identifiers.  Following this the left posterolateral shoulder, just inferior to the acromion, was prepped with chlorhexidine.  Utilizing a 25-gauge needle the left subacromial bursa was injected using a posterolateral to anteromedial approach with 2mL of 1% Lidocaine, 2mL of 0.5% bupivacaine, and 1mL dexamethasone (10mg/mL).  0mL was wasted.      The patient tolerated the procedure without complication and was discharged in good condition after a short observation period.  The patient was instructed to contact me regarding any questions pertaining to the procedure.      DIAGNOSIS:    -Successful injection of the left subacromial bursa without immediate complication      PLAN:   -Post-procedure care reviewed, including avoiding submersion of the injection site for 48 hours   -Return precautions reviewed for signs/symptoms that would be concerning for infection   -The patient was instructed to ice and take APAP this evening if needed  -Continue with therapy  -Continue home exercises  -Continue OTC analgesics as needed  -Workability form filled out  -Return to clinic in 2 months for updated work restrictions  -If symptoms significantly improved with above injection, patient can call for release of work restrictions and follow-up as needed    Tobacco use disorder:  Discussed that smoking can delay soft tissue healing.  - Encouraged cessation      Toño Garcia MD  9/10/2024  7:47 AM    Total time spent with this patient was 27 minutes which included chart review, visualization and independent interpretation of images, time spent with the patient, and documentation.    Procedure time:  4 minute(s)

## 2024-09-17 ENCOUNTER — THERAPY VISIT (OUTPATIENT)
Dept: PHYSICAL THERAPY | Facility: OTHER | Age: 34
End: 2024-09-17
Attending: FAMILY MEDICINE
Payer: OTHER MISCELLANEOUS
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
## 2024-09-17 DIAGNOSIS — S46.912A STRAIN OF LEFT SHOULDER, INITIAL ENCOUNTER: ICD-10-CM

## 2024-09-17 DIAGNOSIS — M75.42 SUBACROMIAL IMPINGEMENT OF LEFT SHOULDER: ICD-10-CM

## 2024-09-17 PROCEDURE — 97016 VASOPNEUMATIC DEVICE THERAPY: CPT | Mod: GP

## 2024-09-17 PROCEDURE — 97110 THERAPEUTIC EXERCISES: CPT | Mod: GP

## 2024-09-24 ENCOUNTER — THERAPY VISIT (OUTPATIENT)
Dept: PHYSICAL THERAPY | Facility: OTHER | Age: 34
End: 2024-09-24
Attending: FAMILY MEDICINE
Payer: OTHER MISCELLANEOUS

## 2024-09-24 DIAGNOSIS — S46.912A STRAIN OF LEFT SHOULDER, INITIAL ENCOUNTER: ICD-10-CM

## 2024-09-24 DIAGNOSIS — M75.42 SUBACROMIAL IMPINGEMENT OF LEFT SHOULDER: ICD-10-CM
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
## 2024-09-24 PROCEDURE — 97035 APP MDLTY 1+ULTRASOUND EA 15: CPT | Mod: GP

## 2024-09-24 PROCEDURE — 97140 MANUAL THERAPY 1/> REGIONS: CPT | Mod: GP

## 2024-10-02 ENCOUNTER — THERAPY VISIT (OUTPATIENT)
Dept: PHYSICAL THERAPY | Facility: OTHER | Age: 34
End: 2024-10-02
Attending: FAMILY MEDICINE
Payer: OTHER MISCELLANEOUS
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
## 2024-10-02 DIAGNOSIS — M75.42 SUBACROMIAL IMPINGEMENT OF LEFT SHOULDER: ICD-10-CM

## 2024-10-02 DIAGNOSIS — S46.912A STRAIN OF LEFT SHOULDER, INITIAL ENCOUNTER: ICD-10-CM

## 2024-10-02 PROCEDURE — 97140 MANUAL THERAPY 1/> REGIONS: CPT | Mod: GP,CQ

## 2024-10-02 PROCEDURE — 97110 THERAPEUTIC EXERCISES: CPT | Mod: GP,CQ

## 2024-10-02 PROCEDURE — 97035 APP MDLTY 1+ULTRASOUND EA 15: CPT | Mod: GP,CQ

## 2024-10-09 ENCOUNTER — THERAPY VISIT (OUTPATIENT)
Dept: PHYSICAL THERAPY | Facility: OTHER | Age: 34
End: 2024-10-09
Attending: FAMILY MEDICINE
Payer: OTHER MISCELLANEOUS

## 2024-10-09 ENCOUNTER — OFFICE VISIT (OUTPATIENT)
Dept: FAMILY MEDICINE | Facility: OTHER | Age: 34
End: 2024-10-09
Attending: FAMILY MEDICINE
Payer: OTHER MISCELLANEOUS

## 2024-10-09 VITALS
WEIGHT: 174 LBS | DIASTOLIC BLOOD PRESSURE: 82 MMHG | RESPIRATION RATE: 16 BRPM | HEART RATE: 84 BPM | SYSTOLIC BLOOD PRESSURE: 122 MMHG | TEMPERATURE: 98.1 F | OXYGEN SATURATION: 98 % | BODY MASS INDEX: 31.83 KG/M2
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
## 2024-10-09 DIAGNOSIS — M75.42 SUBACROMIAL IMPINGEMENT OF LEFT SHOULDER: ICD-10-CM

## 2024-10-09 DIAGNOSIS — S46.912A STRAIN OF LEFT SHOULDER, INITIAL ENCOUNTER: ICD-10-CM

## 2024-10-09 PROCEDURE — 97140 MANUAL THERAPY 1/> REGIONS: CPT | Mod: GP,CQ

## 2024-10-09 PROCEDURE — 99213 OFFICE O/P EST LOW 20 MIN: CPT | Performed by: FAMILY MEDICINE

## 2024-10-09 PROCEDURE — 97110 THERAPEUTIC EXERCISES: CPT | Mod: GP,CQ

## 2024-10-09 PROCEDURE — 97035 APP MDLTY 1+ULTRASOUND EA 15: CPT | Mod: GP,CQ

## 2024-10-09 ASSESSMENT — PAIN SCALES - GENERAL: PAINLEVEL: MODERATE PAIN (5)

## 2024-10-09 NOTE — PROGRESS NOTES
Sports Medicine Office Note    HPI:  34-year-old female coming in for follow-up evaluation of left shoulder pain.  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  She did 2 weeks of anti-inflammatories and return to work without restriction.  She does not feel like she completely resolved/healed from this injury.  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  She continues to deal with pain from this injury.  She was initially seen in this office on 5/15 and was referred to physical therapy.  She followed up in this office again on 7/8 and was noting slight improvement in her symptoms.  She followed up again on 9/10.  She received a subacromial injection.  She felt moderate relief for several days but then her pain has returned to baseline levels.  She currently rates her pain at 5/10.  She characterizes the pain as dull, sharp, aching, and burning.      EXAM:  /82 (BP Location: Right arm, Patient Position: Sitting, Cuff Size: Adult Regular)   Pulse 84   Temp 98.1  F (36.7  C) (Temporal)   Resp 16   Wt 78.9 kg (174 lb)   SpO2 98%   BMI 31.83 kg/m    MUSCULOSKELETAL EXAM:  LEFT SHOULDER  Inspection:  -No gross deformity  -No bruising or swelling  -Scars:  None    Tenderness to palpation of the:  -SC joint:  Negative  -AC joint:  Negative  -Clavicle:  Negative  -Biceps tendon in bicipital groove:  Negative  -Deltoid musculature: Positive beneath the acromion  -Upper trapezius musculature:  Negative    Range of Motion:  -Active flexion:  180 left, 180 right  -Active abduction:  170 left, 170 right    Strength:  -Supraspinatus:  5/5  -Infraspinatus:  5/5  -Subscapularis:  5/5  -Deltoid:  5/5    Special Tests:  -Maurice test: Positive pain without weakness  -Moore test: Positive  -Neer test: Positive  -Mid-arc pain: Present  -Speeds test:  Negative  -O Cristofer active compression test:  Negative  -Crossarm adduction test:  Negative  -Yergason test:  Negative  -Lag  sign:  Negative    Other:  -Intact sensation to light touch distally.  -No signs of cyanosis. Normal skin temperature of the upper extremity.  -Elbow:  No gross deformity. Full range of motion.  -Hand/wrist:  No gross deformity. Full range of motion.  -Right shoulder:  No gross deformity. No palpable tenderness. Normal strength.      IMAGIN2024: 3 view left shoulder x-ray  - No fracture, dislocation, or bony lesion      ASSESSMENT/PLAN:  Diagnoses and all orders for this visit:  Accident while engaged in work-related activity  Subacromial impingement of left shoulder  -     MR Shoulder Left w/o Contrast; Future    34-year-old female with subacromial impingement of the left shoulder.  X-rays from 2024 were again personally reviewed in the office with findings as demonstrated above by my interpretation.  Unfortunately, she has plateaued with respect to physical therapy.  She has tried other treatment options which include activity modification (which includes switching employers), continued PT, oral NSAIDs, and a previous injection.  Given this, I feel now is an appropriate time to move forward with advanced imaging.  - MRI left shoulder  - Continue with physical therapy  - OTC analgesics as needed  - Follow-up in the office to review results of MRI  - Further management based on the results of advanced imaging  - Consider the possibility of needing surgical referral for consideration of subacromial decompression      Toño Garcia MD  10/9/2024  11:32 AM    Total time spent with this patient was 24 minutes which included chart review, visualization and independent interpretation of images, time spent with the patient, and documentation.    Procedure time:  0 minute(s)

## 2024-10-13 DIAGNOSIS — F33.0 MILD RECURRENT MAJOR DEPRESSION (H): ICD-10-CM

## 2024-10-15 RX ORDER — BUPROPION HYDROCHLORIDE 300 MG/1
300 TABLET ORAL EVERY MORNING
Qty: 90 TABLET | Refills: 0 | Status: SHIPPED | OUTPATIENT
Start: 2024-10-15

## 2024-10-15 NOTE — TELEPHONE ENCOUNTER
[id number]  sent Rx request for the following:      Requested Prescriptions   Pending Prescriptions Disp Refills    buPROPion (WELLBUTRIN XL) 300 MG 24 hr tablet [Pharmacy Med Name: buPROPion HCl ER (XL) 300 MG Oral Tablet Extended Release 24 Hour] 90 tablet 0     Sig: TAKE 1 TABLET BY MOUTH IN THE MORNING       Rx Protocol Bupropion Failed - 10/13/2024 10:55 AM        Failed - PHQ-9 score of less than 5 in past 6 months     Please review last PHQ-9 score.      Last Prescription Date:   07/28/2023  Last Fill Qty/Refills:         90, R-4  Last Office Visit:              02/02/2023   Future Office visit:           None    Pt due for a yearly exam. Routing to scheduling to please call and help pt schedule.     Anne Juarez RN on 10/15/2024 at 2:31 PM

## 2024-10-22 ENCOUNTER — HOSPITAL ENCOUNTER (OUTPATIENT)
Dept: MRI IMAGING | Facility: OTHER | Age: 34
Discharge: HOME | End: 2024-10-22
Attending: FAMILY MEDICINE | Admitting: FAMILY MEDICINE
Payer: OTHER MISCELLANEOUS

## 2024-10-22 DIAGNOSIS — M75.42 SUBACROMIAL IMPINGEMENT OF LEFT SHOULDER: ICD-10-CM

## 2024-10-22 PROCEDURE — 73221 MRI JOINT UPR EXTREM W/O DYE: CPT | Mod: LT

## 2024-10-28 ENCOUNTER — TELEPHONE (OUTPATIENT)
Dept: FAMILY MEDICINE | Facility: OTHER | Age: 34
End: 2024-10-28
Payer: COMMERCIAL

## 2024-10-28 NOTE — TELEPHONE ENCOUNTER
Patient states that [facility] is requiring her to ask for extention on workability form until she meets with ortho.  At this time patient does not have an appt.  Writer explained that once appt is made with ortho surgery (anywhere she choices) we can re evaluate   Sue Lim LPN.......10/28/2024 1:18 PM

## 2024-10-28 NOTE — TELEPHONE ENCOUNTER
Jose-patient is looking for workability to be filled out for WC case to cover a gap before she can be seen for Ortho surgery    Please call and advise    Thank You    Saige Pelaez on 10/28/2024 at 12:03 PM

## 2024-11-13 ENCOUNTER — TELEPHONE (OUTPATIENT)
Dept: FAMILY MEDICINE | Facility: OTHER | Age: 34
End: 2024-11-13
Payer: COMMERCIAL

## 2024-11-13 NOTE — TELEPHONE ENCOUNTER
RYAN Lopez MARK ANTHONY, with [employer] requests an update on workability report. Jessica stated the patient has a doctors note from 10/09/2024 appointment that ended 11/10/2024 and requests the note be expended to 12/02/2024 when the patient will be seeing a surgeon for a consult.    Fax ([employer] [facility]): [phone]    Okay to leave detailed message.      Renetta Zapata on 11/13/2024 at 2:58 PM

## 2024-11-14 NOTE — TELEPHONE ENCOUNTER
Form was faxed to [employer] [facility]endar[facility] LPN on 11/14/2024 at 10:35 AM  EXT. [phone]

## 2024-11-14 NOTE — TELEPHONE ENCOUNTER
Please call this patient and let her know that a letter has been updated and was placed in this provider's office outbox.  Thanks.

## 2024-12-02 ENCOUNTER — OFFICE VISIT (OUTPATIENT)
Dept: ORTHOPEDICS | Facility: OTHER | Age: 34
End: 2024-12-02
Attending: ORTHOPAEDIC SURGERY
Payer: OTHER MISCELLANEOUS

## 2024-12-02 VITALS — OXYGEN SATURATION: 100 % | WEIGHT: 174 LBS | HEART RATE: 57 BPM | BODY MASS INDEX: 31.83 KG/M2

## 2024-12-02 DIAGNOSIS — S43.432A SUPERIOR GLENOID LABRUM LESION OF LEFT SHOULDER, INITIAL ENCOUNTER: Primary | ICD-10-CM

## 2024-12-02 ASSESSMENT — PAIN SCALES - GENERAL: PAINLEVEL_OUTOF10: MODERATE PAIN (4)

## 2024-12-02 NOTE — PROGRESS NOTES
Subjective:    34-year-old female here on a Workmen's Comp. claim for her left shoulder.  Injury was April 2024.  She had an injection with Toño Garcia PA-C to no avail.  Shoulder still bothering her.  MRI was done in the past and is available for review today.    Objective:    On examination today is a 34-year-old female in no acute stress.  Very pleasant on examination.  She has significant weakness with supraspinatus and infraspinatus testing of the left shoulder.  She otherwise has good range of motion of the way up to 170 degrees.  She has a positive Neer impingement sign.  She is tender to palpation at Codman's point, nontender to palpation over the AC joint mildly tender to palpation of the bicipital groove.    MRI of the left shoulder shows obvious malalignment of the anterior superior labrum and impingement inside the joint between the humeral head and the glenoid.  This is abnormal and indicates that there is probably a fairly significant SLAP tear    Assessment:    34-year-old female with a type II SLAP tear, possibly even more complex on MRI, recalcitrant to nonoperative measures currently    Plan:    Plan for Sue would be an arthroscopy of her left shoulder to evaluate her labrum under arthroscopic visualization.  Based upon the other findings she would benefit from a subacromial decompression as well.  All the risks and benefits surgical invention were discussed with her and she does wish to proceed.  Will see her back at the time of surgery.

## 2025-01-03 ASSESSMENT — ANXIETY QUESTIONNAIRES
1. FEELING NERVOUS, ANXIOUS, OR ON EDGE: NOT AT ALL
6. BECOMING EASILY ANNOYED OR IRRITABLE: NOT AT ALL
GAD7 TOTAL SCORE: 0
7. FEELING AFRAID AS IF SOMETHING AWFUL MIGHT HAPPEN: NOT AT ALL
5. BEING SO RESTLESS THAT IT IS HARD TO SIT STILL: NOT AT ALL
3. WORRYING TOO MUCH ABOUT DIFFERENT THINGS: NOT AT ALL
IF YOU CHECKED OFF ANY PROBLEMS ON THIS QUESTIONNAIRE, HOW DIFFICULT HAVE THESE PROBLEMS MADE IT FOR YOU TO DO YOUR WORK, TAKE CARE OF THINGS AT HOME, OR GET ALONG WITH OTHER PEOPLE: NOT DIFFICULT AT ALL
2. NOT BEING ABLE TO STOP OR CONTROL WORRYING: NOT AT ALL
GAD7 TOTAL SCORE: 0
8. IF YOU CHECKED OFF ANY PROBLEMS, HOW DIFFICULT HAVE THESE MADE IT FOR YOU TO DO YOUR WORK, TAKE CARE OF THINGS AT HOME, OR GET ALONG WITH OTHER PEOPLE?: NOT DIFFICULT AT ALL
4. TROUBLE RELAXING: NOT AT ALL
7. FEELING AFRAID AS IF SOMETHING AWFUL MIGHT HAPPEN: NOT AT ALL
GAD7 TOTAL SCORE: 0

## 2025-01-05 ASSESSMENT — PATIENT HEALTH QUESTIONNAIRE - PHQ9
SUM OF ALL RESPONSES TO PHQ QUESTIONS 1-9: 2
10. IF YOU CHECKED OFF ANY PROBLEMS, HOW DIFFICULT HAVE THESE PROBLEMS MADE IT FOR YOU TO DO YOUR WORK, TAKE CARE OF THINGS AT HOME, OR GET ALONG WITH OTHER PEOPLE: SOMEWHAT DIFFICULT
SUM OF ALL RESPONSES TO PHQ QUESTIONS 1-9: 2

## 2025-01-06 ENCOUNTER — OFFICE VISIT (OUTPATIENT)
Dept: FAMILY MEDICINE | Facility: OTHER | Age: 35
End: 2025-01-06
Attending: STUDENT IN AN ORGANIZED HEALTH CARE EDUCATION/TRAINING PROGRAM
Payer: OTHER MISCELLANEOUS

## 2025-01-06 VITALS
OXYGEN SATURATION: 94 % | RESPIRATION RATE: 12 BRPM | WEIGHT: 171 LBS | BODY MASS INDEX: 31.47 KG/M2 | DIASTOLIC BLOOD PRESSURE: 70 MMHG | HEART RATE: 86 BPM | TEMPERATURE: 96.7 F | SYSTOLIC BLOOD PRESSURE: 104 MMHG | HEIGHT: 62 IN

## 2025-01-06 DIAGNOSIS — F17.290 NICOTINE DEPENDENCE DUE TO VAPING TOBACCO PRODUCT: ICD-10-CM

## 2025-01-06 DIAGNOSIS — E66.811 CLASS 1 OBESITY WITHOUT SERIOUS COMORBIDITY WITH BODY MASS INDEX (BMI) OF 31.0 TO 31.9 IN ADULT, UNSPECIFIED OBESITY TYPE: ICD-10-CM

## 2025-01-06 DIAGNOSIS — M25.512 LEFT SHOULDER PAIN, UNSPECIFIED CHRONICITY: ICD-10-CM

## 2025-01-06 DIAGNOSIS — Z01.818 PREOPERATIVE EXAMINATION: Primary | ICD-10-CM

## 2025-01-06 DIAGNOSIS — F17.211 CIGARETTE NICOTINE DEPENDENCE IN REMISSION: ICD-10-CM

## 2025-01-06 DIAGNOSIS — K59.00 CONSTIPATION, UNSPECIFIED CONSTIPATION TYPE: ICD-10-CM

## 2025-01-06 DIAGNOSIS — F33.1 MODERATE RECURRENT MAJOR DEPRESSION (H): ICD-10-CM

## 2025-01-06 DIAGNOSIS — Z01.30 BP CHECK: ICD-10-CM

## 2025-01-06 LAB
ALBUMIN SERPL BCG-MCNC: 4.2 G/DL (ref 3.5–5.2)
ALP SERPL-CCNC: 98 U/L (ref 40–150)
ALT SERPL W P-5'-P-CCNC: 28 U/L (ref 0–50)
ANION GAP SERPL CALCULATED.3IONS-SCNC: 14 MMOL/L (ref 7–15)
AST SERPL W P-5'-P-CCNC: 26 U/L (ref 0–45)
BASOPHILS # BLD AUTO: 0 10E3/UL (ref 0–0.2)
BASOPHILS NFR BLD AUTO: 0 %
BILIRUB SERPL-MCNC: 0.2 MG/DL
BUN SERPL-MCNC: 7.2 MG/DL (ref 6–20)
CALCIUM SERPL-MCNC: 9.6 MG/DL (ref 8.8–10.4)
CHLORIDE SERPL-SCNC: 102 MMOL/L (ref 98–107)
CREAT SERPL-MCNC: 0.67 MG/DL (ref 0.51–0.95)
EGFRCR SERPLBLD CKD-EPI 2021: >90 ML/MIN/1.73M2
EOSINOPHIL # BLD AUTO: 0.1 10E3/UL (ref 0–0.7)
EOSINOPHIL NFR BLD AUTO: 1 %
ERYTHROCYTE [DISTWIDTH] IN BLOOD BY AUTOMATED COUNT: 12.8 % (ref 10–15)
GLUCOSE SERPL-MCNC: 101 MG/DL (ref 70–99)
HCG UR QL: NEGATIVE
HCO3 SERPL-SCNC: 21 MMOL/L (ref 22–29)
HCT VFR BLD AUTO: 43.2 % (ref 35–47)
HGB BLD-MCNC: 14.7 G/DL (ref 11.7–15.7)
IMM GRANULOCYTES # BLD: 0 10E3/UL
IMM GRANULOCYTES NFR BLD: 0 %
LYMPHOCYTES # BLD AUTO: 2.9 10E3/UL (ref 0.8–5.3)
LYMPHOCYTES NFR BLD AUTO: 38 %
MCH RBC QN AUTO: 30 PG (ref 26.5–33)
MCHC RBC AUTO-ENTMCNC: 34 G/DL (ref 31.5–36.5)
MCV RBC AUTO: 88 FL (ref 78–100)
MONOCYTES # BLD AUTO: 0.7 10E3/UL (ref 0–1.3)
MONOCYTES NFR BLD AUTO: 10 %
NEUTROPHILS # BLD AUTO: 3.8 10E3/UL (ref 1.6–8.3)
NEUTROPHILS NFR BLD AUTO: 50 %
NRBC # BLD AUTO: 0 10E3/UL
NRBC BLD AUTO-RTO: 0 /100
PLATELET # BLD AUTO: 200 10E3/UL (ref 150–450)
POTASSIUM SERPL-SCNC: 4.2 MMOL/L (ref 3.4–5.3)
PROT SERPL-MCNC: 7.3 G/DL (ref 6.4–8.3)
RBC # BLD AUTO: 4.9 10E6/UL (ref 3.8–5.2)
SODIUM SERPL-SCNC: 137 MMOL/L (ref 135–145)
WBC # BLD AUTO: 7.6 10E3/UL (ref 4–11)

## 2025-01-06 PROCEDURE — 82040 ASSAY OF SERUM ALBUMIN: CPT | Mod: ZL | Performed by: STUDENT IN AN ORGANIZED HEALTH CARE EDUCATION/TRAINING PROGRAM

## 2025-01-06 PROCEDURE — 36415 COLL VENOUS BLD VENIPUNCTURE: CPT | Mod: ZL | Performed by: STUDENT IN AN ORGANIZED HEALTH CARE EDUCATION/TRAINING PROGRAM

## 2025-01-06 PROCEDURE — 85025 COMPLETE CBC W/AUTO DIFF WBC: CPT | Mod: ZL | Performed by: STUDENT IN AN ORGANIZED HEALTH CARE EDUCATION/TRAINING PROGRAM

## 2025-01-06 PROCEDURE — 81025 URINE PREGNANCY TEST: CPT | Mod: ZL | Performed by: STUDENT IN AN ORGANIZED HEALTH CARE EDUCATION/TRAINING PROGRAM

## 2025-01-06 ASSESSMENT — PAIN SCALES - GENERAL: PAINLEVEL_OUTOF10: MODERATE PAIN (4)

## 2025-01-06 NOTE — PROGRESS NOTES
Preoperative Evaluation  [facility]  [address]  [place] [address]  Phone: [phone]  Fax: [phone]  Primary Provider: Hemant Whelan MD  Pre-op Performing Provider: Brian Penn DO  Jan 6, 2025 1/1/2025   Surgical Information   What procedure is being done? Left shoulder surgery   Facility or Hospital where procedure/surgery will be performed: [facility]   Who is doing the procedure / surgery? Dr. Meredith   Date of surgery / procedure: 06/13/2025   Time of surgery / procedure: NA   Where do you plan to recover after surgery? at home with family     Fax number for surgical facility: Note does not need to be faxed, will be available electronically in Epic.    Assessment & Plan     The proposed surgical procedure is considered INTERMEDIATE risk.      (Z01.818) Preoperative examination  (primary encounter diagnosis)  Comment: cleared for proposed surgery  Plan: HCG qualitative urine, CBC and Differential,         Comprehensive metabolic panel, EKG 12-lead,         tracing only (Same Day)    (M25.512) Left shoulder pain, unspecified chronicity  Comment: type II SLAP tear, possibly even more complex on MRI, recalcitrant to nonoperative measures currently thus surgery by Dr. Machado.  Patient has not taken cyclobenzaprine recently since last visit with Dr. Machado.    (F33.1) Moderate recurrent major depression (H)  Comment: on bupropion    (K59.00) Constipation, unspecified constipation type  Comment: takes Miralax ~1x/month    (F17.211) Cigarette nicotine dependence in remission  Comment: Quit cigarettes a month ago but started vaping    (F17.290) Nicotine dependence due to vaping tobacco product  Comment: Started vaping approximately a month ago when she quit cigarettes.  Counseled nicotine cessation  Plan: SMOKING CESSATION COUNSELING 3-10 MIN    (Z01.30) BP check    (E66.811,  Z68.31) Class 1 obesity without serious comorbidity with body mass index (BMI)  of 31.0 to 31.9 in adult, unspecified obesity type  Comment: encouraged weight loss with healthy diet, sleep, and activity               - No identified additional risk factors other than previously addressed    Preoperative Medication Instructions  Antiplatelet or Anticoagulation Medication Instructions   - Patient is on no antiplatelet or anticoagulation medications.    Additional Medication Instructions   - Herbal medications and vitamins: DO NOT TAKE 14 days prior to surgery.   - nicotine: Take up to two hours before surgery   - ibuprofen (Advil, Motrin): DO NOT TAKE 1 day before surgery.    - naproxen (Aleve, Naprosyn): DO NOT TAKE 4 days before surgery.    - SSRIs, SNRIs, TCAs, Antipsychotics: Continue without modification.    - fiber, laxatives: DO NOT TAKE day of surgery   - cannabis, marijuana: DO NOT TAKE night before surgery.   - Topicals: DO NOT TAKE day of surgery.     Recommendation  Approval given to proceed with proposed procedure, without further diagnostic evaluation.    Laura Rogers is a 34 year old, presenting for the following:  Pre-Op Exam (L shoulder scope 1/13/25)          1/6/2025     9:12 AM   Additional Questions   Roomed by chucho arthur lpn     HPI related to upcoming procedure: 34-year-old female with depression, constipation, and headaches who presents for preop evaluation.  Takes Wellbutrin 300 mg regularly which partially controls her depression.  History of arrhythmia approximately 10 years ago thought to have been caused by weight loss medication.  Tachycardia on previous EKG.  Ectopic atrial rhythm and second-degree AV block type I seen on Holter monitor during symptomatic episodes.  After stopping medication has no longer experienced palpitations, chest pain, lightheadedness, shortness of breath as she did with those episodes.    Patient states that her  claims she snores.  Admits fatigue.  Has had 2 previous surgeries with no complications from surgery or  anesthesia.  Possibility of being pregnant but patient doubts due to her previous steriles getting pregnant and her 's low sperm count.  States usually uses condoms; discussed need for contraception between now and surgery as many x-rays could be taken during.        1/1/2025   Pre-Op Questionnaire   Have you ever had a heart attack or stroke? No   Have you ever had surgery on your heart or blood vessels, such as a stent placement, a coronary artery bypass, or surgery on an artery in your head, neck, heart, or legs? No   Do you have chest pain with activity? No   Do you have a history of heart failure? No   Do you currently have a cold, bronchitis or symptoms of other infection? No   Do you have a cough, shortness of breath, or wheezing? No   Do you or anyone in your family have previous history of blood clots? No   Do you or does anyone in your family have a serious bleeding problem such as prolonged bleeding following surgeries or cuts? No   Have you ever had problems with anemia or been told to take iron pills? No   Have you had any abnormal blood loss such as black, tarry or bloody stools, or abnormal vaginal bleeding? No   Have you ever had a blood transfusion? No   Are you willing to have a blood transfusion if it is medically needed before, during, or after your surgery? Yes   Have you or any of your relatives ever had problems with anesthesia? No   Do you have sleep apnea, excessive snoring or daytime drowsiness? No   Do you have any artifical heart valves or other implanted medical devices like a pacemaker, defibrillator, or continuous glucose monitor? No   Do you have artificial joints? No   Are you allergic to latex? No     Health Care Directive  Patient does not have a Health Care Directive: Discussed advance care planning with patient; information given to patient to review.    Preoperative Review of    reviewed - no record of controlled substances prescribed.      PDMP Review         Value  Time User    State PDMP site checked  Yes 1/6/2025  9:42 AM Brian Penn DO              Patient Active Problem List    Diagnosis Date Noted    Accident while engaged in work-related activity 06/20/2024     Priority: Medium    Strain of left shoulder, initial encounter 06/20/2024     Priority: Medium    Subacromial impingement of left shoulder 06/20/2024     Priority: Medium    Carpal tunnel syndrome of right wrist 05/26/2016     Priority: Medium    Encounter for initial prescription of injectable contraceptive 03/29/2016     Priority: Medium    Post partum depression 01/21/2016     Priority: Medium    Vaginal yeast infection 10/01/2015     Priority: Medium    Recurrent umbilical hernia 08/05/2015     Priority: Medium    Headache 03/28/2012     Priority: Medium      Past Medical History:   Diagnosis Date    Headache     3/28/2012     Past Surgical History:   Procedure Laterality Date    OTHER SURGICAL HISTORY      2009,,HERNIA REPAIR    OTHER SURGICAL HISTORY      11/17/15,,HERNIA REPAIR,[facility] with mesh underlay     Current Outpatient Medications   Medication Sig Dispense Refill    buPROPion (WELLBUTRIN XL) 300 MG 24 hr tablet TAKE 1 TABLET BY MOUTH IN THE MORNING 90 tablet 0    polyethylene glycol (MIRALAX) 17 g packet Take 1 packet by mouth daily      cyclobenzaprine (FLEXERIL) 10 MG tablet Take 1 tablet (10 mg) by mouth 3 times daily as needed for muscle spasms 30 tablet 0       Allergies   Allergen Reactions    Sulfa Antibiotics Anaphylaxis    Cats     Dogs     Sumatriptan      Side effects from sumatriptan compounded by using it with her Celexa and Lamictal which may increase blood levels and serotonin effect.  Stay off the sumatriptin at least until she talks to her provider. If they want to use it again, may have to consider a lower dose.         Social History     Tobacco Use    Smoking status: Former     Current packs/day: 0.00     Average packs/day: 0.5 packs/day for 21.9 years (11.0 ttl pk-yrs)  "    Types: Cigarettes     Start date:      Quit date: 2024     Years since quittin.0    Smokeless tobacco: Never   Substance Use Topics    Alcohol use: Not Currently     Comment: 5beers on sundays but quit 24     Family History   Problem Relation Age of Onset    No Known Problems Mother     No Known Problems Father      History   Drug Use    Types: Marijuana     Comment: 1x q3-4days             Review of Systems  Constitutional, HEENT, cardiovascular, pulmonary, GI, , musculoskeletal, neuro, skin, endocrine and psych systems are negative, except as otherwise noted.    Objective    /70 (BP Location: Right arm, Patient Position: Sitting, Cuff Size: Adult Regular)   Pulse 86   Temp (!) 96.7  F (35.9  C) (Temporal)   Resp 12   Ht 1.575 m (5' 2\")   Wt 77.6 kg (171 lb)   LMP 2024 (Within Days)   SpO2 94%   BMI 31.28 kg/m     Estimated body mass index is 31.28 kg/m  as calculated from the following:    Height as of this encounter: 1.575 m (5' 2\").    Weight as of this encounter: 77.6 kg (171 lb).    Physical Exam  GENERAL: alert and no distress  EYES: Eyes grossly normal to inspection, PERRL and conjunctivae and sclerae normal  HENT: ear canals and TM's normal, nose and mouth without ulcers or lesions; Mallampati score 1  NECK: no adenopathy, no asymmetry, masses, or scars  RESP: lungs clear to auscultation - no rales, rhonchi or wheezes  CV: regular rate and rhythm, normal S1 S2, no S3 or S4, no murmur, click or rub, no peripheral edema  ABDOMEN: soft, nontender, no hepatosplenomegaly, no masses and bowel sounds normal  MS: no gross musculoskeletal defects except for left shoulder, no LE edema  SKIN: no visible lesions or rashes; left eyebrow piercing noted  NEURO: No focal deficit, speech normal  PSYCH: mentation appears normal, depressed affect/mood    No results for input(s): \"HGB\", \"PLT\", \"INR\", \"NA\", \"POTASSIUM\", \"CR\", \"A1C\" in the last 8760 hours.     Diagnostics  Labs pending " at this time.  Results will be reviewed when available.   EKG required for history of arrhythmia and not completed in the last 90 days.     Results for orders placed or performed in visit on 01/06/25   EKG 12-lead, tracing only (Same Day)     Status: None (Preliminary result)   Result Value Ref Range    Systolic Blood Pressure  mmHg    Diastolic Blood Pressure  mmHg    Ventricular Rate 67 BPM    Atrial Rate 67 BPM    MA Interval 144 ms    QRS Duration 74 ms     ms    QTc 412 ms    P Axis 48 degrees    R AXIS 73 degrees    T Axis 56 degrees    Interpretation ECG       Sinus rhythm  Normal ECG  No previous ECGs available           Revised Cardiac Risk Index (RCRI)  The patient has the following serious cardiovascular risks for perioperative complications:   - No serious cardiac risks = 0 points     RCRI Interpretation: 0 points: Class I (very low risk - 0.4% complication rate)         Moderate medical decision making with number of chronic conditions to consider for preop, review of medical records, interpretation of test ordered by other physicians which I reviewed with patient, ordered test, obtaining and reviewing EKG, and medication management      The longitudinal plan of care for the diagnosis(es)/condition(s) as documented were addressed during this visit. Due to the added complexity in care, I will continue to support Sue in the subsequent management and with ongoing continuity of care.    Signed Electronically by: Brian Penn DO  A copy of this evaluation report is provided to the requesting physician.         Answers submitted by the patient for this visit:  Patient Health Questionnaire (Submitted on 1/5/2025)  If you checked off any problems, how difficult have these problems made it for you to do your work, take care of things at home, or get along with other people?: Somewhat difficult  PHQ9 TOTAL SCORE: 2  Patient Health Questionnaire (G7) (Submitted on 1/3/2025)  LISA 7 TOTAL SCORE: 0

## 2025-01-06 NOTE — PATIENT INSTRUCTIONS
How to Take Your Medication Before Surgery  Preoperative Medication Instructions   Antiplatelet or Anticoagulation Medication Instructions   - Patient is on no antiplatelet or anticoagulation medications.    Additional Medication Instructions   - Herbal medications and vitamins: DO NOT TAKE 14 days prior to surgery.   - nicotine: Take up to two hours before surgery   - ibuprofen (Advil, Motrin): DO NOT TAKE 1 day before surgery.    - naproxen (Aleve, Naprosyn): DO NOT TAKE 4 days before surgery.    - SSRIs, SNRIs, TCAs, Antipsychotics: Continue without modification.    - fiber, laxatives: DO NOT TAKE day of surgery   - cannabis, marijuana: DO NOT TAKE night before surgery.   - Topicals: DO NOT TAKE day of surgery.

## 2025-01-06 NOTE — H&P (VIEW-ONLY)
Preoperative Evaluation  [facility] AND [facility]  [address]  [place] [address]  Phone: [phone]  Fax: [phone]  Primary Provider: Hemant Whelan MD  Pre-op Performing Provider: Brian Penn DO  Jan 6, 2025 1/1/2025   Surgical Information   What procedure is being done? Left shoulder surgery   Facility or Hospital where procedure/surgery will be performed: [facility]   Who is doing the procedure / surgery? Dr. Meredith   Date of surgery / procedure: 06/13/2025   Time of surgery / procedure: NA   Where do you plan to recover after surgery? at home with family     Fax number for surgical facility: Note does not need to be faxed, will be available electronically in Epic.    Assessment & Plan     The proposed surgical procedure is considered INTERMEDIATE risk.      (Z01.818) Preoperative examination  (primary encounter diagnosis)  Comment: cleared for proposed surgery  Plan: HCG qualitative urine, CBC and Differential,         Comprehensive metabolic panel, EKG 12-lead,         tracing only (Same Day)    (M25.512) Left shoulder pain, unspecified chronicity  Comment: type II SLAP tear, possibly even more complex on MRI, recalcitrant to nonoperative measures currently thus surgery by Dr. Machado.  Patient has not taken cyclobenzaprine recently since last visit with Dr. Machado.    (F33.1) Moderate recurrent major depression (H)  Comment: on bupropion    (K59.00) Constipation, unspecified constipation type  Comment: takes Miralax ~1x/month    (F17.211) Cigarette nicotine dependence in remission  Comment: Quit cigarettes a month ago but started vaping    (F17.290) Nicotine dependence due to vaping tobacco product  Comment: Started vaping approximately a month ago when she quit cigarettes.  Counseled nicotine cessation  Plan: SMOKING CESSATION COUNSELING 3-10 MIN    (Z01.30) BP check    (E66.811,  Z68.31) Class 1 obesity without serious comorbidity with body mass index (BMI)  of 31.0 to 31.9 in adult, unspecified obesity type  Comment: encouraged weight loss with healthy diet, sleep, and activity               - No identified additional risk factors other than previously addressed    Preoperative Medication Instructions  Antiplatelet or Anticoagulation Medication Instructions   - Patient is on no antiplatelet or anticoagulation medications.    Additional Medication Instructions   - Herbal medications and vitamins: DO NOT TAKE 14 days prior to surgery.   - nicotine: Take up to two hours before surgery   - ibuprofen (Advil, Motrin): DO NOT TAKE 1 day before surgery.    - naproxen (Aleve, Naprosyn): DO NOT TAKE 4 days before surgery.    - SSRIs, SNRIs, TCAs, Antipsychotics: Continue without modification.    - fiber, laxatives: DO NOT TAKE day of surgery   - cannabis, marijuana: DO NOT TAKE night before surgery.   - Topicals: DO NOT TAKE day of surgery.     Recommendation  Approval given to proceed with proposed procedure, without further diagnostic evaluation.    Laura Rogers is a 34 year old, presenting for the following:  Pre-Op Exam (L shoulder scope 1/13/25)          1/6/2025     9:12 AM   Additional Questions   Roomed by chucho arthur lpn     HPI related to upcoming procedure: 34-year-old female with depression, constipation, and headaches who presents for preop evaluation.  Takes Wellbutrin 300 mg regularly which partially controls her depression.  History of arrhythmia approximately 10 years ago thought to have been caused by weight loss medication.  Tachycardia on previous EKG.  Ectopic atrial rhythm and second-degree AV block type I seen on Holter monitor during symptomatic episodes.  After stopping medication has no longer experienced palpitations, chest pain, lightheadedness, shortness of breath as she did with those episodes.    Patient states that her  claims she snores.  Admits fatigue.  Has had 2 previous surgeries with no complications from surgery or  anesthesia.  Possibility of being pregnant but patient doubts due to her previous steriles getting pregnant and her 's low sperm count.  States usually uses condoms; discussed need for contraception between now and surgery as many x-rays could be taken during.        1/1/2025   Pre-Op Questionnaire   Have you ever had a heart attack or stroke? No   Have you ever had surgery on your heart or blood vessels, such as a stent placement, a coronary artery bypass, or surgery on an artery in your head, neck, heart, or legs? No   Do you have chest pain with activity? No   Do you have a history of heart failure? No   Do you currently have a cold, bronchitis or symptoms of other infection? No   Do you have a cough, shortness of breath, or wheezing? No   Do you or anyone in your family have previous history of blood clots? No   Do you or does anyone in your family have a serious bleeding problem such as prolonged bleeding following surgeries or cuts? No   Have you ever had problems with anemia or been told to take iron pills? No   Have you had any abnormal blood loss such as black, tarry or bloody stools, or abnormal vaginal bleeding? No   Have you ever had a blood transfusion? No   Are you willing to have a blood transfusion if it is medically needed before, during, or after your surgery? Yes   Have you or any of your relatives ever had problems with anesthesia? No   Do you have sleep apnea, excessive snoring or daytime drowsiness? No   Do you have any artifical heart valves or other implanted medical devices like a pacemaker, defibrillator, or continuous glucose monitor? No   Do you have artificial joints? No   Are you allergic to latex? No     Health Care Directive  Patient does not have a Health Care Directive: Discussed advance care planning with patient; information given to patient to review.    Preoperative Review of    reviewed - no record of controlled substances prescribed.      PDMP Review         Value  Time User    State PDMP site checked  Yes 1/6/2025  9:42 AM Brian Penn DO              Patient Active Problem List    Diagnosis Date Noted    Accident while engaged in work-related activity 06/20/2024     Priority: Medium    Strain of left shoulder, initial encounter 06/20/2024     Priority: Medium    Subacromial impingement of left shoulder 06/20/2024     Priority: Medium    Carpal tunnel syndrome of right wrist 05/26/2016     Priority: Medium    Encounter for initial prescription of injectable contraceptive 03/29/2016     Priority: Medium    Post partum depression 01/21/2016     Priority: Medium    Vaginal yeast infection 10/01/2015     Priority: Medium    Recurrent umbilical hernia 08/05/2015     Priority: Medium    Headache 03/28/2012     Priority: Medium      Past Medical History:   Diagnosis Date    Headache     3/28/2012     Past Surgical History:   Procedure Laterality Date    OTHER SURGICAL HISTORY      2009,,HERNIA REPAIR    OTHER SURGICAL HISTORY      11/17/15,,HERNIA REPAIR,[facility] with mesh underlay     Current Outpatient Medications   Medication Sig Dispense Refill    buPROPion (WELLBUTRIN XL) 300 MG 24 hr tablet TAKE 1 TABLET BY MOUTH IN THE MORNING 90 tablet 0    polyethylene glycol (MIRALAX) 17 g packet Take 1 packet by mouth daily      cyclobenzaprine (FLEXERIL) 10 MG tablet Take 1 tablet (10 mg) by mouth 3 times daily as needed for muscle spasms 30 tablet 0       Allergies   Allergen Reactions    Sulfa Antibiotics Anaphylaxis    Cats     Dogs     Sumatriptan      Side effects from sumatriptan compounded by using it with her Celexa and Lamictal which may increase blood levels and serotonin effect.  Stay off the sumatriptin at least until she talks to her provider. If they want to use it again, may have to consider a lower dose.         Social History     Tobacco Use    Smoking status: Former     Current packs/day: 0.00     Average packs/day: 0.5 packs/day for 21.9 years (11.0 ttl pk-yrs)  "    Types: Cigarettes     Start date:      Quit date: 2024     Years since quittin.0    Smokeless tobacco: Never   Substance Use Topics    Alcohol use: Not Currently     Comment: 5beers on sundays but quit 24     Family History   Problem Relation Age of Onset    No Known Problems Mother     No Known Problems Father      History   Drug Use    Types: Marijuana     Comment: 1x q3-4days             Review of Systems  Constitutional, HEENT, cardiovascular, pulmonary, GI, , musculoskeletal, neuro, skin, endocrine and psych systems are negative, except as otherwise noted.    Objective    /70 (BP Location: Right arm, Patient Position: Sitting, Cuff Size: Adult Regular)   Pulse 86   Temp (!) 96.7  F (35.9  C) (Temporal)   Resp 12   Ht 1.575 m (5' 2\")   Wt 77.6 kg (171 lb)   LMP 2024 (Within Days)   SpO2 94%   BMI 31.28 kg/m     Estimated body mass index is 31.28 kg/m  as calculated from the following:    Height as of this encounter: 1.575 m (5' 2\").    Weight as of this encounter: 77.6 kg (171 lb).    Physical Exam  GENERAL: alert and no distress  EYES: Eyes grossly normal to inspection, PERRL and conjunctivae and sclerae normal  HENT: ear canals and TM's normal, nose and mouth without ulcers or lesions; Mallampati score 1  NECK: no adenopathy, no asymmetry, masses, or scars  RESP: lungs clear to auscultation - no rales, rhonchi or wheezes  CV: regular rate and rhythm, normal S1 S2, no S3 or S4, no murmur, click or rub, no peripheral edema  ABDOMEN: soft, nontender, no hepatosplenomegaly, no masses and bowel sounds normal  MS: no gross musculoskeletal defects except for left shoulder, no LE edema  SKIN: no visible lesions or rashes; left eyebrow piercing noted  NEURO: No focal deficit, speech normal  PSYCH: mentation appears normal, depressed affect/mood    No results for input(s): \"HGB\", \"PLT\", \"INR\", \"NA\", \"POTASSIUM\", \"CR\", \"A1C\" in the last 8760 hours.     Diagnostics  Labs pending " at this time.  Results will be reviewed when available.   EKG required for history of arrhythmia and not completed in the last 90 days.     Results for orders placed or performed in visit on 01/06/25   EKG 12-lead, tracing only (Same Day)     Status: None (Preliminary result)   Result Value Ref Range    Systolic Blood Pressure  mmHg    Diastolic Blood Pressure  mmHg    Ventricular Rate 67 BPM    Atrial Rate 67 BPM    IA Interval 144 ms    QRS Duration 74 ms     ms    QTc 412 ms    P Axis 48 degrees    R AXIS 73 degrees    T Axis 56 degrees    Interpretation ECG       Sinus rhythm  Normal ECG  No previous ECGs available           Revised Cardiac Risk Index (RCRI)  The patient has the following serious cardiovascular risks for perioperative complications:   - No serious cardiac risks = 0 points     RCRI Interpretation: 0 points: Class I (very low risk - 0.4% complication rate)         Moderate medical decision making with number of chronic conditions to consider for preop, review of medical records, interpretation of test ordered by other physicians which I reviewed with patient, ordered test, obtaining and reviewing EKG, and medication management      The longitudinal plan of care for the diagnosis(es)/condition(s) as documented were addressed during this visit. Due to the added complexity in care, I will continue to support Sue in the subsequent management and with ongoing continuity of care.    Signed Electronically by: Brian Penn DO  A copy of this evaluation report is provided to the requesting physician.         Answers submitted by the patient for this visit:  Patient Health Questionnaire (Submitted on 1/5/2025)  If you checked off any problems, how difficult have these problems made it for you to do your work, take care of things at home, or get along with other people?: Somewhat difficult  PHQ9 TOTAL SCORE: 2  Patient Health Questionnaire (G7) (Submitted on 1/3/2025)  LISA 7 TOTAL SCORE: 0     Solaraze Counseling:  I discussed with the patient the risks of Solaraze including but not limited to erythema, scaling, itching, weeping, crusting, and pain.

## 2025-01-07 LAB
ATRIAL RATE - MUSE: 67 BPM
DIASTOLIC BLOOD PRESSURE - MUSE: NORMAL MMHG
INTERPRETATION ECG - MUSE: NORMAL
P AXIS - MUSE: 48 DEGREES
PR INTERVAL - MUSE: 144 MS
QRS DURATION - MUSE: 74 MS
QT - MUSE: 390 MS
QTC - MUSE: 412 MS
R AXIS - MUSE: 73 DEGREES
SYSTOLIC BLOOD PRESSURE - MUSE: NORMAL MMHG
T AXIS - MUSE: 56 DEGREES
VENTRICULAR RATE- MUSE: 67 BPM

## 2025-01-10 ENCOUNTER — ANESTHESIA EVENT (OUTPATIENT)
Dept: SURGERY | Facility: OTHER | Age: 35
End: 2025-01-10
Payer: OTHER MISCELLANEOUS

## 2025-01-13 ENCOUNTER — ANESTHESIA (OUTPATIENT)
Dept: SURGERY | Facility: OTHER | Age: 35
End: 2025-01-13
Payer: OTHER MISCELLANEOUS

## 2025-01-13 ENCOUNTER — HOSPITAL ENCOUNTER (OUTPATIENT)
Facility: OTHER | Age: 35
Discharge: HOME | End: 2025-01-13
Attending: ORTHOPAEDIC SURGERY | Admitting: ORTHOPAEDIC SURGERY
Payer: OTHER MISCELLANEOUS

## 2025-01-13 VITALS
TEMPERATURE: 97.3 F | SYSTOLIC BLOOD PRESSURE: 110 MMHG | WEIGHT: 171 LBS | OXYGEN SATURATION: 95 % | BODY MASS INDEX: 31.47 KG/M2 | RESPIRATION RATE: 16 BRPM | HEART RATE: 82 BPM | DIASTOLIC BLOOD PRESSURE: 65 MMHG | HEIGHT: 62 IN

## 2025-01-13 DIAGNOSIS — M75.42 SUBACROMIAL IMPINGEMENT OF LEFT SHOULDER: Primary | ICD-10-CM

## 2025-01-13 PROCEDURE — 250N000011 HC RX IP 250 OP 636: Performed by: ORTHOPAEDIC SURGERY

## 2025-01-13 PROCEDURE — 258N000001 HC RX 258: Performed by: ORTHOPAEDIC SURGERY

## 2025-01-13 PROCEDURE — 370N000017 HC ANESTHESIA TECHNICAL FEE, PER MIN: Performed by: ORTHOPAEDIC SURGERY

## 2025-01-13 PROCEDURE — 710N000010 HC RECOVERY PHASE 1, LEVEL 2, PER MIN: Performed by: ORTHOPAEDIC SURGERY

## 2025-01-13 PROCEDURE — 710N000012 HC RECOVERY PHASE 2, PER MINUTE: Performed by: ORTHOPAEDIC SURGERY

## 2025-01-13 PROCEDURE — 250N000011 HC RX IP 250 OP 636

## 2025-01-13 PROCEDURE — 272N000001 HC OR GENERAL SUPPLY STERILE: Performed by: ORTHOPAEDIC SURGERY

## 2025-01-13 PROCEDURE — 29826 SHO ARTHRS SRG DECOMPRESSION: CPT | Mod: LT | Performed by: ORTHOPAEDIC SURGERY

## 2025-01-13 PROCEDURE — 250N000009 HC RX 250

## 2025-01-13 PROCEDURE — 29828 SHO ARTHRS SRG BICP TENODSIS: CPT | Mod: LT | Performed by: ORTHOPAEDIC SURGERY

## 2025-01-13 PROCEDURE — 999N000141 HC STATISTIC PRE-PROCEDURE NURSING ASSESSMENT: Performed by: ORTHOPAEDIC SURGERY

## 2025-01-13 PROCEDURE — 29828 SHO ARTHRS SRG BICP TENODSIS: CPT

## 2025-01-13 PROCEDURE — 360N000076 HC SURGERY LEVEL 3, PER MIN: Performed by: ORTHOPAEDIC SURGERY

## 2025-01-13 PROCEDURE — 250N000025 HC SEVOFLURANE, PER MIN: Performed by: ORTHOPAEDIC SURGERY

## 2025-01-13 PROCEDURE — 64415 NJX AA&/STRD BRCH PLXS IMG: CPT | Mod: LT

## 2025-01-13 PROCEDURE — 258N000003 HC RX IP 258 OP 636: Performed by: NURSE ANESTHETIST, CERTIFIED REGISTERED

## 2025-01-13 RX ORDER — ONDANSETRON 2 MG/ML
INJECTION INTRAMUSCULAR; INTRAVENOUS PRN
Status: DISCONTINUED | OUTPATIENT
Start: 2025-01-13 | End: 2025-01-13

## 2025-01-13 RX ORDER — ONDANSETRON 2 MG/ML
4 INJECTION INTRAMUSCULAR; INTRAVENOUS EVERY 30 MIN PRN
Status: DISCONTINUED | OUTPATIENT
Start: 2025-01-13 | End: 2025-01-13 | Stop reason: HOSPADM

## 2025-01-13 RX ORDER — HYDROCODONE BITARTRATE AND ACETAMINOPHEN 5; 325 MG/1; MG/1
1 TABLET ORAL
Status: DISCONTINUED | OUTPATIENT
Start: 2025-01-13 | End: 2025-01-13 | Stop reason: HOSPADM

## 2025-01-13 RX ORDER — FENTANYL CITRATE 50 UG/ML
INJECTION, SOLUTION INTRAMUSCULAR; INTRAVENOUS PRN
Status: DISCONTINUED | OUTPATIENT
Start: 2025-01-13 | End: 2025-01-13

## 2025-01-13 RX ORDER — FENTANYL CITRATE 50 UG/ML
25 INJECTION, SOLUTION INTRAMUSCULAR; INTRAVENOUS EVERY 5 MIN PRN
Status: DISCONTINUED | OUTPATIENT
Start: 2025-01-13 | End: 2025-01-13 | Stop reason: HOSPADM

## 2025-01-13 RX ORDER — IBUPROFEN 200 MG
600 TABLET ORAL
Status: DISCONTINUED | OUTPATIENT
Start: 2025-01-13 | End: 2025-01-13 | Stop reason: HOSPADM

## 2025-01-13 RX ORDER — HYDROMORPHONE HCL IN WATER/PF 6 MG/30 ML
0.4 PATIENT CONTROLLED ANALGESIA SYRINGE INTRAVENOUS EVERY 5 MIN PRN
Status: DISCONTINUED | OUTPATIENT
Start: 2025-01-13 | End: 2025-01-13 | Stop reason: HOSPADM

## 2025-01-13 RX ORDER — SODIUM CHLORIDE, SODIUM LACTATE, POTASSIUM CHLORIDE, CALCIUM CHLORIDE 600; 310; 30; 20 MG/100ML; MG/100ML; MG/100ML; MG/100ML
INJECTION, SOLUTION INTRAVENOUS CONTINUOUS
Status: DISCONTINUED | OUTPATIENT
Start: 2025-01-13 | End: 2025-01-13 | Stop reason: HOSPADM

## 2025-01-13 RX ORDER — HYDROMORPHONE HCL IN WATER/PF 6 MG/30 ML
0.2 PATIENT CONTROLLED ANALGESIA SYRINGE INTRAVENOUS EVERY 5 MIN PRN
Status: DISCONTINUED | OUTPATIENT
Start: 2025-01-13 | End: 2025-01-13 | Stop reason: HOSPADM

## 2025-01-13 RX ORDER — GLYCOPYRROLATE 0.2 MG/ML
INJECTION, SOLUTION INTRAMUSCULAR; INTRAVENOUS PRN
Status: DISCONTINUED | OUTPATIENT
Start: 2025-01-13 | End: 2025-01-13

## 2025-01-13 RX ORDER — KETAMINE HYDROCHLORIDE 10 MG/ML
INJECTION INTRAMUSCULAR; INTRAVENOUS PRN
Status: DISCONTINUED | OUTPATIENT
Start: 2025-01-13 | End: 2025-01-13

## 2025-01-13 RX ORDER — DEXAMETHASONE SODIUM PHOSPHATE 10 MG/ML
INJECTION, SOLUTION INTRAMUSCULAR; INTRAVENOUS
Status: COMPLETED | OUTPATIENT
Start: 2025-01-13 | End: 2025-01-13

## 2025-01-13 RX ORDER — LIDOCAINE 40 MG/G
CREAM TOPICAL
Status: DISCONTINUED | OUTPATIENT
Start: 2025-01-13 | End: 2025-01-13 | Stop reason: HOSPADM

## 2025-01-13 RX ORDER — NALOXONE HYDROCHLORIDE 0.4 MG/ML
0.1 INJECTION, SOLUTION INTRAMUSCULAR; INTRAVENOUS; SUBCUTANEOUS
Status: DISCONTINUED | OUTPATIENT
Start: 2025-01-13 | End: 2025-01-13 | Stop reason: HOSPADM

## 2025-01-13 RX ORDER — ONDANSETRON 4 MG/1
4 TABLET, ORALLY DISINTEGRATING ORAL EVERY 30 MIN PRN
Status: DISCONTINUED | OUTPATIENT
Start: 2025-01-13 | End: 2025-01-13 | Stop reason: HOSPADM

## 2025-01-13 RX ORDER — FENTANYL CITRATE 50 UG/ML
50 INJECTION, SOLUTION INTRAMUSCULAR; INTRAVENOUS EVERY 5 MIN PRN
Status: DISCONTINUED | OUTPATIENT
Start: 2025-01-13 | End: 2025-01-13 | Stop reason: HOSPADM

## 2025-01-13 RX ORDER — PROPOFOL 10 MG/ML
INJECTION, EMULSION INTRAVENOUS PRN
Status: DISCONTINUED | OUTPATIENT
Start: 2025-01-13 | End: 2025-01-13

## 2025-01-13 RX ORDER — HYDROCODONE BITARTRATE AND ACETAMINOPHEN 5; 325 MG/1; MG/1
1-2 TABLET ORAL EVERY 4 HOURS PRN
Qty: 40 TABLET | Refills: 0 | Status: SHIPPED | OUTPATIENT
Start: 2025-01-13

## 2025-01-13 RX ORDER — BUPIVACAINE HYDROCHLORIDE 5 MG/ML
INJECTION, SOLUTION EPIDURAL; INTRACAUDAL
Status: COMPLETED | OUTPATIENT
Start: 2025-01-13 | End: 2025-01-13

## 2025-01-13 RX ORDER — CEFAZOLIN SODIUM/WATER 2 G/20 ML
2 SYRINGE (ML) INTRAVENOUS SEE ADMIN INSTRUCTIONS
Status: DISCONTINUED | OUTPATIENT
Start: 2025-01-13 | End: 2025-01-13 | Stop reason: HOSPADM

## 2025-01-13 RX ORDER — DEXAMETHASONE SODIUM PHOSPHATE 10 MG/ML
4 INJECTION, SOLUTION INTRAMUSCULAR; INTRAVENOUS
Status: DISCONTINUED | OUTPATIENT
Start: 2025-01-13 | End: 2025-01-13 | Stop reason: HOSPADM

## 2025-01-13 RX ORDER — CEFAZOLIN SODIUM/WATER 2 G/20 ML
2 SYRINGE (ML) INTRAVENOUS
Status: COMPLETED | OUTPATIENT
Start: 2025-01-13 | End: 2025-01-13

## 2025-01-13 RX ORDER — IBUPROFEN 600 MG/1
600 TABLET, FILM COATED ORAL EVERY 6 HOURS PRN
Qty: 30 TABLET | Refills: 0 | Status: SHIPPED | OUTPATIENT
Start: 2025-01-13

## 2025-01-13 RX ADMIN — GLYCOPYRROLATE 0.2 MG: 0.2 INJECTION, SOLUTION INTRAMUSCULAR; INTRAVENOUS at 07:42

## 2025-01-13 RX ADMIN — MIDAZOLAM 4 MG: 1 INJECTION INTRAMUSCULAR; INTRAVENOUS at 07:20

## 2025-01-13 RX ADMIN — SODIUM CHLORIDE, POTASSIUM CHLORIDE, SODIUM LACTATE AND CALCIUM CHLORIDE 100 ML/HR: 600; 310; 30; 20 INJECTION, SOLUTION INTRAVENOUS at 06:51

## 2025-01-13 RX ADMIN — Medication 20 MG: at 07:56

## 2025-01-13 RX ADMIN — PROPOFOL 50 MG: 10 INJECTION, EMULSION INTRAVENOUS at 07:50

## 2025-01-13 RX ADMIN — GLYCOPYRROLATE 0.1 MG: 0.2 INJECTION, SOLUTION INTRAMUSCULAR; INTRAVENOUS at 07:56

## 2025-01-13 RX ADMIN — FENTANYL CITRATE 25 MCG: 50 INJECTION INTRAMUSCULAR; INTRAVENOUS at 08:13

## 2025-01-13 RX ADMIN — FENTANYL CITRATE 25 MCG: 50 INJECTION INTRAMUSCULAR; INTRAVENOUS at 08:32

## 2025-01-13 RX ADMIN — BUPIVACAINE HYDROCHLORIDE 20 ML: 5 INJECTION, SOLUTION EPIDURAL; INTRACAUDAL; PERINEURAL at 07:28

## 2025-01-13 RX ADMIN — FENTANYL CITRATE 50 MCG: 50 INJECTION INTRAMUSCULAR; INTRAVENOUS at 07:43

## 2025-01-13 RX ADMIN — DEXAMETHASONE SODIUM PHOSPHATE 10 MG: 10 INJECTION, SOLUTION INTRAMUSCULAR; INTRAVENOUS at 07:28

## 2025-01-13 RX ADMIN — Medication 10 MG: at 08:32

## 2025-01-13 RX ADMIN — Medication 10 MG: at 08:13

## 2025-01-13 RX ADMIN — ONDANSETRON HYDROCHLORIDE 4 MG: 2 SOLUTION INTRAMUSCULAR; INTRAVENOUS at 07:50

## 2025-01-13 RX ADMIN — Medication 2 G: at 07:28

## 2025-01-13 RX ADMIN — PROPOFOL 150 MG: 10 INJECTION, EMULSION INTRAVENOUS at 07:47

## 2025-01-13 ASSESSMENT — ACTIVITIES OF DAILY LIVING (ADL)
ADLS_ACUITY_SCORE: 41

## 2025-01-13 NOTE — ANESTHESIA PREPROCEDURE EVALUATION
Anesthesia Pre-Procedure Evaluation    Patient: Sue POWELL [facility]   MRN: [id number] : 1990        Procedure : Procedure(s):  Arthroscopy shoulder, LEFT SHOULDER SCOPE, SUBACROMIAL DECOMPRESSION WITH POSSIBLE SUPERIOR LABRUM ANTERIOR POSTERIOR AND POSSIBLE BICEPS TENDOESIS          Past Medical History:   Diagnosis Date    Headache     3/28/2012      Past Surgical History:   Procedure Laterality Date    OTHER SURGICAL HISTORY      ,,HERNIA REPAIR    OTHER SURGICAL HISTORY      11/17/15,,HERNIA REPAIR,[facility] with mesh underlay      Allergies   Allergen Reactions    Sulfa Antibiotics Anaphylaxis    Cats     Dogs     Sumatriptan      Side effects from sumatriptan compounded by using it with her Celexa and Lamictal which may increase blood levels and serotonin effect.  Stay off the sumatriptin at least until she talks to her provider. If they want to use it again, may have to consider a lower dose.       Social History     Tobacco Use    Smoking status: Former     Current packs/day: 0.00     Average packs/day: 0.5 packs/day for 21.9 years (11.0 ttl pk-yrs)     Types: Cigarettes     Start date:      Quit date: 2024     Years since quittin.1    Smokeless tobacco: Never   Substance Use Topics    Alcohol use: Not Currently     Comment: 5beers on sundays but quit 24      Wt Readings from Last 1 Encounters:   25 77.6 kg (171 lb)        Anesthesia Evaluation   Pt has had prior anesthetic. Type: General (Hx of GA for hernia repair).    No history of anesthetic complications       ROS/MED HX  ENT/Pulmonary:     (+)     BRENDA risk factors, snores loudly,  obese,   allergic rhinitis,                             Neurologic: Comment: Hx of headaches      Cardiovascular: Comment: Hx of tachycardia related to dietary supplementation (10 years ago). Has since stopped and symptoms resolved.     (+)  - -   -  - -                                 Previous cardiac testing   Echo: Date:  Results:    Stress Test:  Date: Results:    ECG Reviewed:  Date: Results:  67  Sinus rhythm   Normal ECG   No previous ECGs available   Confirmed by MD EYAL, VIET ([id number]) on 1/7/2025 1:23:21 PM     Cath:  Date: Results:      METS/Exercise Tolerance: >4 METS    Hematologic:  - neg hematologic  ROS     Musculoskeletal:  - neg musculoskeletal ROS     GI/Hepatic:  - neg GI/hepatic ROS     Renal/Genitourinary:  - neg Renal ROS     Endo:  - neg endo ROS     Psychiatric/Substance Use:     (+) psychiatric history depression       Infectious Disease:  - neg infectious disease ROS     Malignancy:  - neg malignancy ROS     Other:            Physical Exam    Airway        Mallampati: II   TM distance: > 3 FB   Neck ROM: full   Mouth opening: > 3 cm    Respiratory Devices and Support         Dental     Comment: Upper plate in place.     (+) Removable bridges or other hardware      Cardiovascular   cardiovascular exam normal       Rhythm and rate: regular and normal     Pulmonary   pulmonary exam normal        breath sounds clear to auscultation           OUTSIDE LABS:  CBC:   Lab Results   Component Value Date    WBC 7.6 01/06/2025    HGB 14.7 01/06/2025    HGB 15.7 08/16/2016    HCT 43.2 01/06/2025    HCT 46.0 08/16/2016     01/06/2025     08/16/2016     BMP:   Lab Results   Component Value Date     01/06/2025     08/16/2016    POTASSIUM 4.2 01/06/2025    POTASSIUM 4.0 08/16/2016    CHLORIDE 102 01/06/2025    CHLORIDE 107 08/16/2016    CO2 21 (L) 01/06/2025    CO2 22 08/16/2016    BUN 7.2 01/06/2025    BUN 12 08/16/2016    CR 0.67 01/06/2025    CR 0.71 08/16/2016     (H) 01/06/2025     08/16/2016     COAGS:   Lab Results   Component Value Date    PTT 32 08/16/2016    INR 1.00 03/17/2023     POC:   Lab Results   Component Value Date    HCG Negative 01/06/2025     HEPATIC:   Lab Results   Component Value Date    ALBUMIN 4.2 01/06/2025    PROTTOTAL 7.3 01/06/2025    ALT 28 01/06/2025     "AST 26 01/06/2025    ALKPHOS 98 01/06/2025    BILITOTAL 0.2 01/06/2025     OTHER:   Lab Results   Component Value Date    YESICA 9.6 01/06/2025    MAG 2.3 02/18/2016    LIPASE 13.0 09/10/2014    TSH 1.81 03/17/2023       Anesthesia Plan    ASA Status:  2    NPO Status:  NPO Appropriate    Anesthesia Type: General.     - Airway: LMA   Induction: Intravenous, Propofol.   Maintenance: Balanced.        Consents    Anesthesia Plan(s) and associated risks, benefits, and realistic alternatives discussed. Questions answered and patient/representative(s) expressed understanding.     - Discussed: Risks, Benefits and Alternatives for BOTH SEDATION and the PROCEDURE were discussed     - Discussed with:  Patient, Parent (Mother and/or Father)      - Specific Concerns: Aspiraiton, stroke, heart attack, LAST, infection, nerve injury, and bleeding..     - Extended Intubation/Ventilatory Support Discussed: No.      - Patient is DNR/DNI Status: No     Use of blood products discussed: No .     Postoperative Care    Pain management: Multi-modal analgesia.   PONV prophylaxis: Ondansetron (or other 5HT-3), Dexamethasone or Solumedrol     Comments:               SUZAN Valdez CRNA    I have reviewed the pertinent notes and labs in the chart from the past 30 days and (re)examined the patient.  Any updates or changes from those notes are reflected in this note.                         # Obesity: Estimated body mass index is 31.28 kg/m  as calculated from the following:    Height as of this encounter: 1.575 m (5' 2\").    Weight as of this encounter: 77.6 kg (171 lb).             "

## 2025-01-13 NOTE — BRIEF OP NOTE
[facility]    Brief Operative Note    Pre-operative diagnosis: Impingement of shoulder [M25.819]  Post-operative diagnosis left shoulder rotator cuff tendinitis, Melissa complex, biceps tendinitis    Procedure: Arthroscopy shoulder, LEFT SHOULDER SCOPE, SUBACROMIAL DECOMPRESSION, BICEPS TENDOESIS, Left - Shoulder    Surgeon: Surgeons and Role:     * Ryan Machado MD - Primary     * Shankar Nova PA - Assisting  Anesthesia: General with Block   Estimated Blood Loss: Minimal    Drains: None  Specimens: * No specimens in log *  Findings:   As noted in operative report.  Complications: None.  Implants: * No implants in log *           normal appearance, no JVD

## 2025-01-13 NOTE — OR NURSING
Patient was discharged home  via ambulatory.   Discharge instructions were reviewed with patient and mother. And they verbalized understanding.   Prescriptions: picked up by mother, given to mother    Patient denies pain.  Wearing a sling  Ice packs given.

## 2025-01-13 NOTE — OP NOTE
Preop Dx: Left shoulder rotator cuff tendinitis, possible SLAP tear    Postop Dx: Left shoulder rotator cuff tendinitis, Melissa complex, biceps tendinitis    Procedure: Left shoulder arthroscopy with subacromial decompression, biceps tenodesis    Surgeon: Ryan Machado MD    Assistant: RICHARD Pringle/RN    Anesthesia: General With postop pain control per anesthesia nerve block at my request    Blood Loss: Minimal    Indications: The patient is a 34-year-old female with longstanding left shoulder pain.  MRI revealed that she had a possible SLAP tear along with significant rotator cuff tendinitis and minimal AC joint arthritis.  All of the risks and benefits surgical intervention were discussed with her and given that she had exhausted all conservative measures for treatment she wished to proceed.    Description: Patient was taken to the operating room and after adequate induction of anesthesia was positioned, prepped and draped in the usual sterile fashion on the operative table.  A universal protocol timeout was initiated and once all in the room were in agreement an incision was made the posterior of the shoulder for the posterior portal.  The scope trocar and cannula were advanced into the glenohumeral joint the trocar was swapped for the camera.  The glenoid cartilage was intact.  The humeral cartilage was intact.  The rotator cuff was intact.  A significant Melissa complex was appreciated in the anterior of the shoulder with a very noticeable sublabral foramen.  The superior glenoid labrum appeared to be intact but the biceps was somewhat unstable given the anatomic anomaly.  An anterior portal was created via needle localization and the biceps was pulled into the joint to examine.  There was a significant amount of injection and neovascularization within the tendon.  It was determined at this point that we would perform a biceps tenodesis.  This was undertaken utilizing my standard technique and the biceps  was released off the superior glenoid labrum utilizing arthroscopic scissors.  The ends of the suture were tagged inside the shoulder for later tying in the subacromial space.  The camera is then removed from the glenohumeral joint placed in the subacromial space.  Once in the subacromial space an anterolateral portal was created via needle localization.  A complete bursectomy was then performed of the subacromial space.  Attention was then turned to the biceps tenodesis sutures.  These were brought out the anterior portal and subsequently tied over the rotator interval with the arm in external rotation to prevent internal rotation contracture.  The ends of the suture were then cut.  The arm was brought back to neutral and utilizing radiofrequency ablator we removed the coracoacromial ligament off of the undersurface of the acromion.  This exposed a small broad-based enthesophyte of the anterior acromion that was subsequently burred down until flat with a 4 mm barrel bur.  Adequate resection as documented with a camera.  The rotator cuff was inspected and found to be intact.  This concluded the arthroscopy.  4-0 nylon was used in a buried figure-of-eight fashion close the skin of all 3 portals and the patient was taken in stable condition to the postanesthesia care unit.  She will be in a standard shoulder protocol with biceps precautions

## 2025-01-13 NOTE — ANESTHESIA PROCEDURE NOTES
Airway       Patient location during procedure: OR       Procedure Start/Stop Times: 1/13/2025 7:51 AM  Staff -        CRNA: Raymundo Dial APRN CRNA       Performed By: CRNA  Consent for Airway        Urgency: elective  Indications and Patient Condition       Indications for airway management: jesus-procedural       Induction type:intravenous       Mask difficulty assessment: 1 - vent by mask    Final Airway Details       Final airway type: supraglottic airway    Supraglottic Airway Details        Type: LMA       Brand: I-Gel       LMA size: 4    Post intubation assessment        Placement verified by: capnometry and chest rise        Number of attempts at approach: 1       Number of other approaches attempted: 0       Secured with: tape       Ease of procedure: easy       Dentition: Intact and Unchanged    Medication(s) Administered   Medication Administration Time: 1/13/2025 7:51 AM

## 2025-01-13 NOTE — ANESTHESIA CARE TRANSFER NOTE
Patient: Sue De La Fuente    Procedure: Procedure(s):  Arthroscopy shoulder, LEFT SHOULDER SCOPE, SUBACROMIAL DECOMPRESSION, BICEPS TENDOESIS       Diagnosis: Impingement of shoulder [M25.819]  Diagnosis Additional Information: No value filed.    Anesthesia Type:   General     Note:    Oropharynx: spontaneously breathing and oropharynx clear of all foreign objects  Level of Consciousness: awake and drowsy  Oxygen Supplementation: room air    Independent Airway: airway patency satisfactory and stable  Dentition: dentition unchanged  Vital Signs Stable: post-procedure vital signs reviewed and stable  Report to RN Given: handoff report given  Patient transferred to: PACU    Handoff Report: Identifed the Patient, Identified the Reponsible Provider, Reviewed the pertinent medical history, Discussed the surgical course, Reviewed Intra-OP anesthesia mangement and issues during anesthesia, Set expectations for post-procedure period and Allowed opportunity for questions and acknowledgement of understanding      Vitals:  Vitals Value Taken Time   /77 01/13/25 0850   Temp 96.8  F (36  C) 01/13/25 0845   Pulse 112 01/13/25 0852   Resp 9 01/13/25 0852   SpO2 95 % 01/13/25 0852   Vitals shown include unfiled device data.    Electronically Signed By: SUZAN Valdez CRNA  January 13, 2025  8:52 AM

## 2025-01-13 NOTE — ANESTHESIA POSTPROCEDURE EVALUATION
Patient: Sue De La Fuente    Procedure: Procedure(s):  Arthroscopy shoulder, LEFT SHOULDER SCOPE, SUBACROMIAL DECOMPRESSION, BICEPS TENDOESIS       Anesthesia Type:  General    Note:  Disposition: Outpatient   Postop Pain Control: Uneventful            Sign Out: Well controlled pain   PONV: No   Neuro/Psych: Uneventful            Sign Out: Acceptable/Baseline neuro status   Airway/Respiratory: Uneventful            Sign Out: Acceptable/Baseline resp. status   CV/Hemodynamics: Uneventful            Sign Out: Acceptable CV status; No obvious hypovolemia; No obvious fluid overload   Other NRE: NONE   DID A NON-ROUTINE EVENT OCCUR?            Last vitals:  Vitals Value Taken Time   /78 01/13/25 0901   Temp 96.8  F (36  C) 01/13/25 0855   Pulse 112 01/13/25 0901   Resp 20 01/13/25 0901   SpO2 95 % 01/13/25 0901       Electronically Signed By: SUZAN Centeno CRNA  January 13, 2025  10:04 AM

## 2025-01-13 NOTE — INTERVAL H&P NOTE
"I have reviewed the surgical (or preoperative) H&P that is linked to this encounter, and examined the patient. There are no significant changes    Clinical Conditions Present on Arrival:  Clinically Significant Risk Factors Present on Admission                       # Obesity: Estimated body mass index is 31.28 kg/m  as calculated from the following:    Height as of this encounter: 1.575 m (5' 2\").    Weight as of this encounter: 77.6 kg (171 lb).       "

## 2025-01-13 NOTE — ANESTHESIA PROCEDURE NOTES
Brachial plexus Procedure Note    Pre-Procedure   Staff -        CRNA: Raymundo Dial APRN CRNA       Performed By: CRNA       Location: pre-op       Procedure Start/Stop Times: 1/13/2025 7:20 AM and 1/13/2025 7:28 AM       Pre-Anesthestic Checklist: patient identified, IV checked, site marked, risks and benefits discussed, informed consent, monitors and equipment checked, pre-op evaluation, at physician/surgeon's request and post-op pain management  Timeout:       Correct Patient: Yes        Correct Procedure: Yes        Correct Site: Yes        Correct Position: Yes        Correct Laterality: Yes        Site Marked: Yes  Procedure Documentation  Procedure: Brachial plexus         Diagnosis: POST OPERATIVE PAIN MANAGEMENT       Laterality: left       Patient Position: sitting       Patient Prep/Sterile Barriers: sterile gloves, mask       Skin prep: Chloraprep       Local skin infiltrated with 1 mL of 1% lidocaine.  (interscalene approach).       Needle Type: insulated and short bevel       Needle Gauge: 20.        Needle Length (Inches): 4        Ultrasound guided       1. Ultrasound was used to identify targeted nerve, plexus, vascular marker, or fascial plane and place a needle adjacent to it in real-time.       2. Ultrasound was used to visualize the spread of anesthetic in close proximity to the above referenced structure.       3. A permanent image is entered into the patient's record.       4. The visualized anatomic structures appeared normal.       5. There were no apparent abnormal pathologic findings.       Nerve Stim: Initial Level 1 mA.  Lowest motor response 0.6 mA.    Assessment/Narrative         The placement was negative for: blood aspirated, painful injection and site bleeding       Paresthesias: No.       Bolus given via needle..        Secured via.        Insertion/Infusion Method: Single Shot       Complications: none       Injection made incrementally with aspirations every 5  "mL.    Medication(s) Administered   Bupivacaine 0.5% PF (Infiltration) - Infiltration   20 mL - 1/13/2025 7:28:00 AM  Dexamethasone 10 mg/mL PF (Perineural) - Perineural   10 mg - 1/13/2025 7:28:00 AM  Medication Administration Time: 1/13/2025 7:20 AM      FOR [facility] ONLY:   Pain Team Contact information: please page the Pain Team Via [employer]. Search \"Pain\". During daytime hours, please page the attending first. At night please page the resident first.      "

## 2025-01-13 NOTE — DISCHARGE INSTRUCTIONS
If you have any questions or concerns, please call the [facility] Triage Line at [phone] during normal business hours, after hours call [phone].

## 2025-01-13 NOTE — OR NURSING
PACU Respiratory Event Documentation     1) Episodes of Apnea greater than or equal to 10 seconds: no    2) Bradypnea - less than 8 breaths per minute: no    3) Pain score on 0 to 10 scale: denies    4) Pain-sedation mismatch (yes or no): no    5) Repeated 02 desaturation less than 90% (yes or no): no    Anesthesia notified? (yes or no): no    Any of the above events occuring repeatedly in separate 30 minute intervals may be considered recurrent PACU respiratory events.     Samira Garcia RN

## 2025-01-27 ENCOUNTER — OFFICE VISIT (OUTPATIENT)
Dept: ORTHOPEDICS | Facility: OTHER | Age: 35
End: 2025-01-27
Payer: OTHER MISCELLANEOUS

## 2025-01-27 DIAGNOSIS — Z98.890 S/P ARTHROSCOPY OF LEFT SHOULDER: Primary | ICD-10-CM

## 2025-01-27 PROCEDURE — 99024 POSTOP FOLLOW-UP VISIT: CPT

## 2025-01-27 NOTE — PROGRESS NOTES
SUBJECTIVE:  Sue is a 34-year-old female being seen today for a 2-week postop follow-up for a left shoulder arthroscopy with a subacromial decompression, and biceps tenodesis in relation to a Worker's Comp claim.  Today, Sue reports pain improved considerably within the last several days now rated as 3/10 at the worst and well-managed with OTC analgesics.  She has been wearing her sling as directed and denies any use of the left arm or shoulder.  She denies any numbness or tingling in the left upper extremity.  She denies any concerns for the surgical incisions.  She is scheduled to begin physical therapy within the next couple of weeks.    OBJECTIVE:  Sue is a 34-year-old female alert and oriented in no acute distress and is wearing her sling comfortably.  The surgical incisions appear to be healing adequately without evidence of infection such as erythema, warmth or drainage.  Range of motion and strength shoulder were not assessed today due to proximity to surgery.  She has good range of motion of the elbow wrist and fingers.  She is otherwise neurovascularly intact.    ASSESSMENT/PLAN:  1. S/P arthroscopy of left shoulder (Primary)  Sue is to remain in her sling through 2/10/2025.  At which time she may discontinue the sling and will then have a 2 pound weight lifting restriction for the next 4 weeks.  We discussed she should not engage in any external rotation or extension.  Established with physical therapy as scheduled.  Begin working on elbow range of motion at home as well as [employer]'s pendulums.  Will follow-up in 4 weeks.  She will remain out of work for the time being.

## 2025-02-13 ENCOUNTER — THERAPY VISIT (OUTPATIENT)
Dept: PHYSICAL THERAPY | Facility: OTHER | Age: 35
End: 2025-02-13
Attending: ORTHOPAEDIC SURGERY
Payer: OTHER MISCELLANEOUS

## 2025-02-13 DIAGNOSIS — M75.42 SUBACROMIAL IMPINGEMENT OF LEFT SHOULDER: ICD-10-CM

## 2025-02-13 PROCEDURE — 97016 VASOPNEUMATIC DEVICE THERAPY: CPT | Mod: GP

## 2025-02-13 PROCEDURE — 97161 PT EVAL LOW COMPLEX 20 MIN: CPT | Mod: GP

## 2025-02-13 PROCEDURE — 97110 THERAPEUTIC EXERCISES: CPT | Mod: GP

## 2025-02-13 NOTE — PROGRESS NOTES
PHYSICAL THERAPY EVALUATION  Type of Visit: Evaluation        Fall Risk Screen:  Fall screen completed by: PT  Have you fallen 2 or more times in the past year?: No  Have you fallen and had an injury in the past year?: No  Is patient a fall risk?: No    Subjective Sue is a 34 year old female presenting to the clinic s/p shoulder arthroscopy with subacromial decompression, distal clavicle excision and biceps tenodesis on 1/13/2025. Patient reports she is having problems with pain, swelling, loss of movements, stiffness, and loss of strength. She describes her pain as sharp, dull, aching, and shooting. She reports her pain is constant. Pain is worse with lifting, carrying, certain positions, reaching overhead and household tasks. Pain is better with rest, OTC medications, heat and cold. Patient had a 2 week follow-up and everything is looking good. Today is her first day without her sling. She has been doing her elbow ROM. She started her pendulums. She is on a 2lb weight lifting restriction starting Monday. No ER or shoulder extension. She reports her shoulder feels stiff. Sleep is difficult and she struggles to stay asleep. She is currently sleeping on the couch to prevent herself from rolling on her shoulder. She is only taking Tylenol and Ibuprofen to manage pain. She ices 2-3x's per day to manage pain. She heats her shoulder before pendulums.  has been helping her in the shower and getting dressed. She can get dressed on her own it just takes longer.  and mom help with IADLs.         Presenting condition or subjective complaint: Left shoulder surgery recovery  Date of onset: 01/13/25    Relevant medical history:     Dates & types of surgery: January 13th 2025    Prior diagnostic imaging/testing results: MRI; X-ray; Other Surgery   Prior therapy history for the same diagnosis, illness or injury: Yes PT prior to surgery    Prior Level of Function  Transfers: Independent  Ambulation:  Independent  ADL: Independent  IADL: Childcare, Driving, Finances, Housekeeping, Laundry, Meal preparation, Medication management, Work, Yard work    Living Environment  Social support: With a significant other or spouse   Type of home: House   Stairs to enter the home: Yes 3 Is there a railing: No     Ramp: No   Stairs inside the home: Yes 13 Is there a railing: Yes     Help at home: None  Equipment owned: Crutches     Employment: Yes Kitchen  Hobbies/Interests: Colette, model cars, outdoors activities    Patient goals for therapy: Range of motion and weight bearing     Objective   SHOULDER EVALUATION  PAIN: Pain Level at Rest: 5/10  Pain Level with Use: 10/10  Pain Location: shoulder  Pain Quality: Aching, Dull, Sharp, and Shooting  Pain Frequency: constant  Pain is Worst: daytime  Pain is Exacerbated By: Movement  Pain is Relieved By: cold, heat, otc medications, and rest  Pain Progression: Improved  INTEGUMENTARY (edema, incisions):  Healing incisions  POSTURE: WFL- Guards shoulder keeping it tucked into her body when resting intead of down by her side. No arm swing when walking.   ROM:  Left shoulder flexion 90*, abduction 50*- some pain with movement  STRENGTH:  Unable to assess due to restrictions  PALPATION:  Minimal swelling, tenderness around incisions    Assessment & Plan   CLINICAL IMPRESSIONS  Medical Diagnosis: Subacromial impingement of left shoulder (M75.42)    Treatment Diagnosis:     Impression/Assessment: Patient is a 34 year old female with s/p shoulder arthroscopy with subacromial decompression, distal clavicle excision and biceps tenodesis on 1/13/2025.  The following significant findings have been identified: Pain, Decreased ROM/flexibility, Decreased joint mobility, Decreased strength, Impaired sensation, Inflammation, Impaired muscle performance, and Decreased activity tolerance. These impairments interfere with their ability to perform self care tasks, work tasks, recreational activities,  household chores, household mobility, and community mobility as compared to previous level of function.     Clinical Decision Making (Complexity):  Clinical Presentation: Evolving/Changing  Clinical Presentation Rationale: based on medical and personal factors listed in PT evaluation  Clinical Decision Making (Complexity): Low complexity    PLAN OF CARE  Treatment Interventions:  Modalities: Cryotherapy, E-stim, Hot Pack, Ultrasound, Vasoneumatic Device  Interventions: Manual Therapy, Neuromuscular Re-education, Therapeutic Activity, Therapeutic Exercise, Aquatic Therapy    Long Term Goals     PT Goal 1  Goal Identifier: Pain  Goal Description: Patient will report pain no greater then 2/10 without the use of pain medication in order to sleep 6 consecutive hours at night to help with healing in the next 4 weeks.  Rationale: to maximize safety and independence with performance of ADLs and functional tasks;to maximize safety and independence within the home  Target Date: 03/13/25  PT Goal 2  Goal Identifier: HEP  Goal Description: Patient will demonstrate proper form and technique with HEP and report completion of HEP at least 4x per week in order to progress exercises in the next 6 weeks.  Rationale: to maximize safety and independence with performance of ADLs and functional tasks;to maximize safety and independence within the home  Target Date: 03/27/25  PT Goal 3  Goal Identifier: ROM  Goal Description: Patient will demonstrate left shoulder flexion and abduction of 170* and ER of 80* in order to complete ADLs without needing increased time in the next 10 weeks.  Rationale: to maximize safety and independence with performance of ADLs and functional tasks;to maximize safety and independence within the home  Target Date: 04/24/25  PT Goal 4  Goal Identifier: Strength  Goal Description: Patient will able to complete shoulder I, Y, and T's using 3lbs in standing with proper form in order to complete IADLs in the next 12  weeks.  Rationale: to maximize safety and independence with performance of ADLs and functional tasks;to maximize safety and independence within the home  Target Date: 05/08/25      Frequency of Treatment: 2x per week  Duration of Treatment: 12 weeks    Education Assessment:   Learner/Method: Patient;Listening;Reading;Demonstration;Pictures/Video    Risks and benefits of evaluation/treatment have been explained.   Patient/Family/caregiver agrees with Plan of Care.     Evaluation Time:     PT Eval, Low Complexity Minutes (70724): 30     Signing Clinician: SHILPA PATTEN PT

## 2025-02-19 ENCOUNTER — THERAPY VISIT (OUTPATIENT)
Dept: PHYSICAL THERAPY | Facility: OTHER | Age: 35
End: 2025-02-19
Attending: ORTHOPAEDIC SURGERY
Payer: OTHER MISCELLANEOUS

## 2025-02-19 DIAGNOSIS — M75.42 SUBACROMIAL IMPINGEMENT OF LEFT SHOULDER: Primary | ICD-10-CM

## 2025-02-19 PROCEDURE — 97110 THERAPEUTIC EXERCISES: CPT | Mod: GP

## 2025-02-19 PROCEDURE — 97016 VASOPNEUMATIC DEVICE THERAPY: CPT | Mod: GP

## 2025-02-24 ENCOUNTER — OFFICE VISIT (OUTPATIENT)
Dept: ORTHOPEDICS | Facility: OTHER | Age: 35
End: 2025-02-24
Payer: OTHER MISCELLANEOUS

## 2025-02-24 DIAGNOSIS — Z98.890 S/P ARTHROSCOPY OF LEFT SHOULDER: Primary | ICD-10-CM

## 2025-02-24 PROCEDURE — 99024 POSTOP FOLLOW-UP VISIT: CPT

## 2025-02-24 NOTE — PROGRESS NOTES
SUBJECTIVE:  Sue is a 34-year-old female now 6 weeks status post a left shoulder arthroscopy with subacromial decompression and biceps tenodesis in relation to a Worker's Comp. claim.  Today, she reports pain radiating slightly manageable 3/10 at worst.  She has been very mindful of to avoid any heavy lifting beyond 2 pounds.  She continues to follow with physical therapy where they are working on range of motion only and have not yet started any strengthening.    OBJECTIVE:  34-year-old female alert and oriented in no acute distress.  The surgical incisions are well-healed.  She has forward flexion and abduction of about 100 degrees with internal rotation to the hip.  She is able to pronate and supinate without difficulty.  Strength was not assessed today.    ASSESSMENT/PLAN:  1. S/P arthroscopy of left shoulder (Primary)  Continue to follow with physical therapy and work on home exercise plan daily.  We will plan to keep her out of work for an additional 2 weeks and return her return her on 3/10/2025 with a 2 pound weight lifting restriction and no overhead use through 3/24/2025.  We will plan to follow-up at that time and update restrictions.

## 2025-02-26 ENCOUNTER — THERAPY VISIT (OUTPATIENT)
Dept: PHYSICAL THERAPY | Facility: OTHER | Age: 35
End: 2025-02-26
Attending: ORTHOPAEDIC SURGERY
Payer: OTHER MISCELLANEOUS

## 2025-02-26 DIAGNOSIS — M75.42 SUBACROMIAL IMPINGEMENT OF LEFT SHOULDER: Primary | ICD-10-CM

## 2025-02-26 PROCEDURE — 97016 VASOPNEUMATIC DEVICE THERAPY: CPT | Mod: GP

## 2025-02-26 PROCEDURE — 97110 THERAPEUTIC EXERCISES: CPT | Mod: GP

## 2025-03-05 ENCOUNTER — THERAPY VISIT (OUTPATIENT)
Dept: PHYSICAL THERAPY | Facility: OTHER | Age: 35
End: 2025-03-05
Attending: ORTHOPAEDIC SURGERY
Payer: OTHER MISCELLANEOUS

## 2025-03-05 DIAGNOSIS — M75.42 SUBACROMIAL IMPINGEMENT OF LEFT SHOULDER: Primary | ICD-10-CM

## 2025-03-05 PROCEDURE — 97110 THERAPEUTIC EXERCISES: CPT | Mod: GP

## 2025-03-05 PROCEDURE — 97016 VASOPNEUMATIC DEVICE THERAPY: CPT | Mod: GP

## 2025-03-10 ENCOUNTER — THERAPY VISIT (OUTPATIENT)
Dept: PHYSICAL THERAPY | Facility: OTHER | Age: 35
End: 2025-03-10
Attending: ORTHOPAEDIC SURGERY
Payer: OTHER MISCELLANEOUS

## 2025-03-10 DIAGNOSIS — M75.42 SUBACROMIAL IMPINGEMENT OF LEFT SHOULDER: Primary | ICD-10-CM

## 2025-03-10 PROCEDURE — 97110 THERAPEUTIC EXERCISES: CPT | Mod: GP

## 2025-03-20 ENCOUNTER — THERAPY VISIT (OUTPATIENT)
Dept: PHYSICAL THERAPY | Facility: OTHER | Age: 35
End: 2025-03-20
Attending: ORTHOPAEDIC SURGERY
Payer: OTHER MISCELLANEOUS

## 2025-03-20 DIAGNOSIS — M75.42 SUBACROMIAL IMPINGEMENT OF LEFT SHOULDER: Primary | ICD-10-CM

## 2025-03-20 PROCEDURE — 97016 VASOPNEUMATIC DEVICE THERAPY: CPT | Mod: GP

## 2025-03-20 PROCEDURE — 97110 THERAPEUTIC EXERCISES: CPT | Mod: GP

## 2025-03-24 ENCOUNTER — OFFICE VISIT (OUTPATIENT)
Dept: ORTHOPEDICS | Facility: OTHER | Age: 35
End: 2025-03-24
Payer: OTHER MISCELLANEOUS

## 2025-03-24 DIAGNOSIS — Z98.890 S/P ARTHROSCOPY OF LEFT SHOULDER: Primary | ICD-10-CM

## 2025-03-24 PROCEDURE — 99024 POSTOP FOLLOW-UP VISIT: CPT

## 2025-03-24 NOTE — PROGRESS NOTES
SUBJECTIVE:  Sue is a 34-year-old female now 10 weeks status post a left shoulder arthroscopy with subacromial decompression and biceps tenodesis for a Worker's Comp. claim.  Today, she states having some soreness at baseline in the front of her shoulder rated as 1-2/10.  Following physical therapy or her home exercise plan pain may increase to a 3/10.  She continues to work at [employer] where she is now the .  She reports having no troubles fulfilling her duties.  She continues to follow with physical therapy twice a week and will soon begin working on strengthening.  At home she has been consistently lifting 5 to 10 pounds without significant issue or discomfort.  When reaching into cabinets to lift heavier dishes she is cautious and deliberate with her action and is able to accomplish that task without difficulty.    OBJECTIVE:  34-year-old female alert and oriented in no acute distress.  The surgical incisions are well-healed.  She is mildly tender to palpation over the bicipital groove.  She has minimal to no tenderness to palpation of the AC joint or Codman's point.  Her range of motion is improving with 140 degrees of forward flexion, 15 degrees of external rotation and 120 degrees of abduction.  She is neurovascularly intact.    ASSESSMENT/PLAN:  1. S/P arthroscopy of left shoulder (Primary)  Sue is doing quite well at this point with therapy and her home exercise plan.  She is happy with her progress to date and has no concerns.  At this point we will increase her restrictions to no lifting greater than 20 pounds below the waist and no lifting greater than 5 pounds overhead.  Continue to follow with physical therapy and we will follow-up in 3 weeks due to the Easter holiday.

## 2025-03-25 ENCOUNTER — THERAPY VISIT (OUTPATIENT)
Dept: PHYSICAL THERAPY | Facility: OTHER | Age: 35
End: 2025-03-25
Attending: ORTHOPAEDIC SURGERY
Payer: OTHER MISCELLANEOUS

## 2025-03-25 DIAGNOSIS — M75.42 SUBACROMIAL IMPINGEMENT OF LEFT SHOULDER: Primary | ICD-10-CM

## 2025-03-25 PROCEDURE — 97110 THERAPEUTIC EXERCISES: CPT | Mod: GP,CQ

## 2025-03-25 PROCEDURE — 97140 MANUAL THERAPY 1/> REGIONS: CPT | Mod: GP,CQ

## 2025-04-01 ENCOUNTER — THERAPY VISIT (OUTPATIENT)
Dept: PHYSICAL THERAPY | Facility: OTHER | Age: 35
End: 2025-04-01
Attending: ORTHOPAEDIC SURGERY
Payer: OTHER MISCELLANEOUS

## 2025-04-01 DIAGNOSIS — M75.42 SUBACROMIAL IMPINGEMENT OF LEFT SHOULDER: Primary | ICD-10-CM

## 2025-04-01 PROCEDURE — 97140 MANUAL THERAPY 1/> REGIONS: CPT | Mod: GP

## 2025-04-01 PROCEDURE — 97110 THERAPEUTIC EXERCISES: CPT | Mod: GP

## 2025-04-01 NOTE — PROGRESS NOTES
PLAN  Continue therapy per current plan of care.    Beginning/End Dates of Progress Note Reporting Period:  02/13/25 to 04/01/2025    Referring Provider:  Ryan Machado       04/01/25 0500   Appointment Info   Signing clinician's name / credentials Jessica Brown, PT, DPT   Total/Authorized Visits 10 of 10   Visits Used 10   Medical Diagnosis Subacromial impingement of left shoulder (M75.42)   Progress Note/Certification   Start of Care Date 02/13/25   Onset of illness/injury or Date of Surgery 01/13/25   Therapy Frequency 2x per week   Predicted Duration 12 weeks   Progress Note Completed Date 02/13/25   Supervision   PT Assistant Visit Number 3   PT Goal 1   Goal Identifier Pain   Goal Description Patient will report pain no greater then 2/10 without the use of pain medication in order to sleep 6 consecutive hours at night to help with healing in the next 4 weeks.   Rationale to maximize safety and independence with performance of ADLs and functional tasks;to maximize safety and independence within the home   Goal Progress In progress   Target Date 03/13/25   PT Goal 2   Goal Identifier HEP   Goal Description Patient will demonstrate proper form and technique with HEP and report completion of HEP at least 4x per week in order to progress exercises in the next 6 weeks.   Rationale to maximize safety and independence with performance of ADLs and functional tasks;to maximize safety and independence within the home   Target Date 03/27/25   Goal Progress In progress   PT Goal 3   Goal Identifier ROM   Goal Description Patient will demonstrate left shoulder flexion and abduction of 170* and ER of 80* in order to complete ADLs without needing increased time in the next 10 weeks.   Rationale to maximize safety and independence with performance of ADLs and functional tasks;to maximize safety and independence within the home   Target Date 04/24/25   Goal Progress In progress   PT Goal 4   Goal Identifier Strength  "  Goal Description Patient will able to complete shoulder I, Y, and T's using 3lbs in standing with proper form in order to complete IADLs in the next 12 weeks.   Rationale to maximize safety and independence with performance of ADLs and functional tasks;to maximize safety and independence within the home   Target Date 05/08/25   Goal Progress In progress   Subjective Report   Subjective Report Sue reports that her arms is stiff. They increased her weight limit to 20lbs below the waist and 5lbs above the head. Swelling comes and goes- more often when she does too much. Repetitive movements make arm sore at the end of the day. Patient reports ROM is slowly improving. She has pulley's at home.   Objective Measure 1   Objective Measure Posture   Details Guards shoulder keeping it tucked into her body when resting intead of down by her side. No arm swing when walking.   Objective Measure 2   Objective Measure Palpation   Details Minimal swelling, tenderness around incisions   Objective Measure 3   Objective Measure ROM   Details PROM Left shoulder flexion 110*, abduction 90* \"good pain\" at end range, IR/ER 8* each AROM flexion 90*, abduction 80*   Vasopneumatic Device   Vasopneumatic device minutes (26915) 2   Treatment Detail left shoulder   Vasopneumatic Pressure medium   Duration 10 min   Temperature 4/5   Patient Response/Progress Set-up only   Therapeutic Procedure/Exercise   Therapeutic Procedures: strength, endurance, ROM, flexibility minutes (13667) 15   Ther Proc 1 Shoulder pulley's flexion and abduction x2 minutes each. UBE 4 minutes forward and backward level 2. PROM shoulder IR/ER.   Ther Proc 1 - Details Improved ROM with pulley's. Patient has more of an elastic end feel with shoulder ER compared to a firm end feel a week ago. Improved stretch with prolonged hold.   Skilled Intervention Cuing for proper form in order to increase strength/ROM to decrease pain and return to PLOF. Adjusting exercise in " order for patient to make maximum gains and fatigue in the appropriate time frame.   Manual Therapy   Manual Therapy: Mobilization, MFR, MLD, friction massage minutes (60552) 30   Manual Therapy 1 MFR/TPR   Patient Response/Progress improved ease of ROM   Manual Therapy 1 - Details MFR/TPR to pec minor, axillary fascial release, medial and lateral MFR to intermuscualr septum, in supine and SL on R, in prone MFR and TPR to rotator cuff and rhomboids   Skilled Intervention MFR to decrease pain and restriction to improve mobility   Education   Learner/Method Patient;Listening;Reading;Demonstration;Pictures/Video   Plan   Home program HEP provided with written and visual instructions.   Comments   Comments Sue is a 34 year old female presenting to the clinic s/p shoulder arthroscopy with subacromial decompression, distal clavicle excision and biceps tenodesis on 1/13/2025. Patient reports she is having problems with pain, swelling, loss of movements, stiffness, and loss of strength. She describes her pain as sharp, dull, aching, and shooting. She reports her pain is constant. Pain is worse with lifting, carrying, certain positions, reaching overhead and household tasks. Pain is better with rest, OTC medications, heat and cold. Patient had a 2 week follow-up and everything is looking good. Today is her first day without her sling. She has been doing her elbow ROM. She started her pendulums. She is on a 2lb weight lifting restriction starting Monday. No ER or shoulder extension. She reports her shoulder feels stiff. Sleep is difficult and she struggles to stay asleep. She is currently sleeping on the couch to prevent herself from rolling on her shoulder. She is only taking Tylenol and Ibuprofen to manage pain. She ices 2-3x's per day to manage pain. She heats her shoulder before pendulums.  has been helping her in the shower and getting dressed. She can get dressed on her own it just takes longer.  and  mom help with IADLs.   Total Session Time   Timed Code Treatment Minutes 45   Total Treatment Time (sum of timed and untimed services) 47

## 2025-04-03 ENCOUNTER — THERAPY VISIT (OUTPATIENT)
Dept: PHYSICAL THERAPY | Facility: OTHER | Age: 35
End: 2025-04-03
Attending: ORTHOPAEDIC SURGERY
Payer: OTHER MISCELLANEOUS

## 2025-04-03 DIAGNOSIS — M75.42 SUBACROMIAL IMPINGEMENT OF LEFT SHOULDER: Primary | ICD-10-CM

## 2025-04-03 PROCEDURE — 97140 MANUAL THERAPY 1/> REGIONS: CPT | Mod: GP

## 2025-04-03 PROCEDURE — 97110 THERAPEUTIC EXERCISES: CPT | Mod: GP

## 2025-04-08 ENCOUNTER — THERAPY VISIT (OUTPATIENT)
Dept: PHYSICAL THERAPY | Facility: OTHER | Age: 35
End: 2025-04-08
Attending: ORTHOPAEDIC SURGERY
Payer: OTHER MISCELLANEOUS

## 2025-04-08 DIAGNOSIS — M75.42 SUBACROMIAL IMPINGEMENT OF LEFT SHOULDER: Primary | ICD-10-CM

## 2025-04-08 PROCEDURE — 97110 THERAPEUTIC EXERCISES: CPT | Mod: GP

## 2025-04-08 PROCEDURE — 97140 MANUAL THERAPY 1/> REGIONS: CPT | Mod: GP

## 2025-04-10 ENCOUNTER — THERAPY VISIT (OUTPATIENT)
Dept: PHYSICAL THERAPY | Facility: OTHER | Age: 35
End: 2025-04-10
Attending: ORTHOPAEDIC SURGERY
Payer: OTHER MISCELLANEOUS

## 2025-04-10 DIAGNOSIS — M75.42 SUBACROMIAL IMPINGEMENT OF LEFT SHOULDER: Primary | ICD-10-CM

## 2025-04-10 PROCEDURE — 97140 MANUAL THERAPY 1/> REGIONS: CPT | Mod: GP

## 2025-04-10 PROCEDURE — 97110 THERAPEUTIC EXERCISES: CPT | Mod: GP

## 2025-04-11 ENCOUNTER — LAB (OUTPATIENT)
Dept: LAB | Facility: OTHER | Age: 35
End: 2025-04-11
Attending: NURSE PRACTITIONER
Payer: COMMERCIAL

## 2025-04-11 DIAGNOSIS — R30.0 DYSURIA: ICD-10-CM

## 2025-04-11 DIAGNOSIS — R30.0 DIFFICULT OR PAINFUL URINATION: ICD-10-CM

## 2025-04-11 LAB
ALBUMIN UR-MCNC: NEGATIVE MG/DL
APPEARANCE UR: CLEAR
BILIRUB UR QL STRIP: NEGATIVE
COLOR UR AUTO: NORMAL
GLUCOSE UR STRIP-MCNC: NEGATIVE MG/DL
HGB UR QL STRIP: NEGATIVE
KETONES UR STRIP-MCNC: NEGATIVE MG/DL
LEUKOCYTE ESTERASE UR QL STRIP: NEGATIVE
NITRATE UR QL: NEGATIVE
PH UR STRIP: 7.5 [PH] (ref 5–9)
SP GR UR STRIP: 1.01 (ref 1–1.03)
UROBILINOGEN UR STRIP-MCNC: NORMAL MG/DL

## 2025-04-11 PROCEDURE — 87086 URINE CULTURE/COLONY COUNT: CPT | Mod: ZL

## 2025-04-11 PROCEDURE — 81003 URINALYSIS AUTO W/O SCOPE: CPT | Mod: ZL

## 2025-04-13 LAB — BACTERIA UR CULT: NO GROWTH

## 2025-04-17 ENCOUNTER — THERAPY VISIT (OUTPATIENT)
Dept: PHYSICAL THERAPY | Facility: OTHER | Age: 35
End: 2025-04-17
Attending: ORTHOPAEDIC SURGERY
Payer: OTHER MISCELLANEOUS

## 2025-04-17 DIAGNOSIS — M75.42 SUBACROMIAL IMPINGEMENT OF LEFT SHOULDER: Primary | ICD-10-CM

## 2025-04-17 PROCEDURE — 97110 THERAPEUTIC EXERCISES: CPT | Mod: GP

## 2025-04-17 PROCEDURE — 97140 MANUAL THERAPY 1/> REGIONS: CPT | Mod: GP

## 2025-04-21 ENCOUNTER — OFFICE VISIT (OUTPATIENT)
Dept: ORTHOPEDICS | Facility: OTHER | Age: 35
End: 2025-04-21
Payer: OTHER MISCELLANEOUS

## 2025-04-21 DIAGNOSIS — Z98.890 S/P ARTHROSCOPY OF LEFT SHOULDER: Primary | ICD-10-CM

## 2025-04-21 PROCEDURE — 99212 OFFICE O/P EST SF 10 MIN: CPT

## 2025-04-21 NOTE — PROGRESS NOTES
SUBJECTIVE:  Sue is a 34-year-old female now 14 weeks status post a left shoulder arthroscopy with subacromial decompression and biceps tenodesis for a Workmen's Comp. claim.  Today, Sue reports pain has been quite minimal other than some mild soreness following therapy.  She has been lifting upwards of 15 pounds without much difficulty but has limited repetitive overhead work while weightbearing.  She continues to work without issue.  She would like to continue physical therapy with a focus on strengthening and range of motion.    OBJECTIVE:  34-year-old female alert and oriented in no acute distress. Sue is nearing full range of motion.  She has good strength of the rotator cuff in all planes without any significant pain or weakness.  She has good biceps contour bilaterally.  She is neurovascularly intact.    ASSESSMENT/PLAN:  1. S/P arthroscopy of left shoulder (Primary)  Sue feels as if she is doing quite well at this point.  However, she would like to continue following with physical therapy to further improve upon strengthening and maximize her range of motion more quickly.  At work we will increase restrictions to lifting no more than 30 pounds below the waist and no lifting greater than 10 pounds overhead.  We will follow-up in 4 weeks.

## 2025-04-23 ENCOUNTER — THERAPY VISIT (OUTPATIENT)
Dept: PHYSICAL THERAPY | Facility: OTHER | Age: 35
End: 2025-04-23
Attending: ORTHOPAEDIC SURGERY
Payer: OTHER MISCELLANEOUS

## 2025-04-23 DIAGNOSIS — M75.42 SUBACROMIAL IMPINGEMENT OF LEFT SHOULDER: Primary | ICD-10-CM

## 2025-04-23 PROCEDURE — 97140 MANUAL THERAPY 1/> REGIONS: CPT | Mod: GP

## 2025-04-23 PROCEDURE — 97110 THERAPEUTIC EXERCISES: CPT | Mod: GP

## 2025-04-28 ENCOUNTER — THERAPY VISIT (OUTPATIENT)
Dept: PHYSICAL THERAPY | Facility: OTHER | Age: 35
End: 2025-04-28
Attending: ORTHOPAEDIC SURGERY
Payer: OTHER MISCELLANEOUS

## 2025-04-28 DIAGNOSIS — M75.42 SUBACROMIAL IMPINGEMENT OF LEFT SHOULDER: Primary | ICD-10-CM

## 2025-04-28 PROCEDURE — 97140 MANUAL THERAPY 1/> REGIONS: CPT | Mod: GP

## 2025-04-28 PROCEDURE — 97110 THERAPEUTIC EXERCISES: CPT | Mod: GP

## 2025-04-30 ENCOUNTER — THERAPY VISIT (OUTPATIENT)
Dept: PHYSICAL THERAPY | Facility: OTHER | Age: 35
End: 2025-04-30
Attending: ORTHOPAEDIC SURGERY
Payer: OTHER MISCELLANEOUS

## 2025-04-30 DIAGNOSIS — M75.42 SUBACROMIAL IMPINGEMENT OF LEFT SHOULDER: Primary | ICD-10-CM

## 2025-04-30 PROCEDURE — 97140 MANUAL THERAPY 1/> REGIONS: CPT | Mod: GP

## 2025-04-30 PROCEDURE — 97110 THERAPEUTIC EXERCISES: CPT | Mod: GP

## 2025-05-05 ENCOUNTER — THERAPY VISIT (OUTPATIENT)
Dept: PHYSICAL THERAPY | Facility: OTHER | Age: 35
End: 2025-05-05
Attending: ORTHOPAEDIC SURGERY
Payer: OTHER MISCELLANEOUS

## 2025-05-05 DIAGNOSIS — M75.42 SUBACROMIAL IMPINGEMENT OF LEFT SHOULDER: Primary | ICD-10-CM

## 2025-05-05 PROCEDURE — 97110 THERAPEUTIC EXERCISES: CPT | Mod: GP

## 2025-05-05 PROCEDURE — 97140 MANUAL THERAPY 1/> REGIONS: CPT | Mod: GP

## 2025-05-08 ENCOUNTER — THERAPY VISIT (OUTPATIENT)
Dept: PHYSICAL THERAPY | Facility: OTHER | Age: 35
End: 2025-05-08
Attending: ORTHOPAEDIC SURGERY
Payer: OTHER MISCELLANEOUS

## 2025-05-08 DIAGNOSIS — M75.42 SUBACROMIAL IMPINGEMENT OF LEFT SHOULDER: Primary | ICD-10-CM

## 2025-05-08 PROCEDURE — 97140 MANUAL THERAPY 1/> REGIONS: CPT | Mod: GP

## 2025-05-08 PROCEDURE — 97110 THERAPEUTIC EXERCISES: CPT | Mod: GP

## 2025-05-11 ENCOUNTER — HEALTH MAINTENANCE LETTER (OUTPATIENT)
Age: 35
End: 2025-05-11

## 2025-05-12 ENCOUNTER — THERAPY VISIT (OUTPATIENT)
Dept: PHYSICAL THERAPY | Facility: OTHER | Age: 35
End: 2025-05-12
Attending: ORTHOPAEDIC SURGERY
Payer: OTHER MISCELLANEOUS

## 2025-05-12 DIAGNOSIS — M75.42 SUBACROMIAL IMPINGEMENT OF LEFT SHOULDER: Primary | ICD-10-CM

## 2025-05-12 PROCEDURE — 97140 MANUAL THERAPY 1/> REGIONS: CPT | Mod: GP

## 2025-05-12 PROCEDURE — 97110 THERAPEUTIC EXERCISES: CPT | Mod: GP

## 2025-05-12 NOTE — PROGRESS NOTES
PLAN  Continue therapy per current plan of care. Patient continues with limitations in all scapular ranges of motion but internal and external rotation are the most impacted which limits functional mobility of ADLs and IADLs. Minimal strengthening has occurred due to limitations in mobility.    Beginning/End Dates of Progress Note Reporting Period:  02/13/25 to 05/12/2025    Referring Provider:  Ryan Machado        05/12/25 0500   Appointment Info   Signing clinician's name / credentials SIM Galloway   Total/Authorized Visits 10 of 10   Visits Used 20   Medical Diagnosis Subacromial impingement of left shoulder (M75.42)   Progress Note/Certification   Start of Care Date 02/13/25   Onset of illness/injury or Date of Surgery 01/13/25   Therapy Frequency 2x per week   Predicted Duration 12 weeks   Progress Note Completed Date 02/13/25   Supervision   Student Supervision On-site supervision provided   PT Assistant Visit Number 3   PT Goal 1   Goal Identifier Pain   Goal Description Patient will report pain no greater then 2/10 without the use of pain medication in order to sleep 6 consecutive hours at night to help with healing in the next 4 weeks.   Rationale to maximize safety and independence with performance of ADLs and functional tasks;to maximize safety and independence within the home   Goal Progress In progress   Target Date 03/13/25   PT Goal 2   Goal Identifier HEP   Goal Description Patient will demonstrate proper form and technique with HEP and report completion of HEP at least 4x per week in order to progress exercises in the next 6 weeks.   Rationale to maximize safety and independence with performance of ADLs and functional tasks;to maximize safety and independence within the home   Goal Progress In progress   Target Date 03/27/25   PT Goal 3   Goal Identifier ROM   Goal Description Patient will demonstrate left shoulder flexion and abduction of 170* and ER of 80* in order to complete ADLs  without needing increased time in the next 10 weeks.   Rationale to maximize safety and independence with performance of ADLs and functional tasks;to maximize safety and independence within the home   Goal Progress In progress   Target Date 04/24/25   PT Goal 4   Goal Identifier Strength   Goal Description Patient will able to complete shoulder I, Y, and T's using 3lbs in standing with proper form in order to complete IADLs in the next 12 weeks.   Rationale to maximize safety and independence with performance of ADLs and functional tasks;to maximize safety and independence within the home   Goal Progress In progress   Target Date 05/08/25   Subjective Report   Subjective Report Sue reports shoulder is alright, with pain at 2-3/10.  She reports her shoulder is tired.  She reports she a nice quiet weekend, did was unable to complete her HEP, but rested her shoudler.  She shared with work today her shoulder was not too bad because Monday's are order days with little lifting.   Objective Measure 1   Objective Measure Posture   Details Slight arm swing when walking with arm kept slightly internally rotated.   Objective Measure 2   Objective Measure Palpation   Details Tenderness to biceps tendon and pec major/minor as well as upper trap and levator with palpation.   Objective Measure 3   Objective Measure ROM   Details PROM Left shoulder flexion 150*, abduction 132*, IR 35*, ER 15*, AROM flexion 140*, abduction 120 *  (Pain with IR and ER. Stretch feeling with flexion and abduction.  )   Vasopneumatic Device   Treatment Detail left shoulder   Vasopneumatic Pressure medium   Duration 15 min   Temperature 5/5   Patient Response/Progress Set-up only   Therapeutic Procedure/Exercise   Therapeutic Procedures: strength, endurance, ROM, flexibility minutes (63424) 20   Ther Proc 1 UBE 10 min total 5 min forward and 5 min backward at level 2. Seated exercises: scapular retraction x 10, IYT x 10. Prone lat pull down x 2,  Prayer stretch 2 x 30 seconds.   Ther Proc 1 - Details Patient was unable to complete prone lat pull downs because she still lacks ER of her left shoulder.  When completing IYTs, patient had mild shoulder hike with Y and T of the exercise. Seated scaupular rectraction was also difficult to complete due to lack of  left shoudler ABD and extension.  Patient had difficulty positioning her elbows together for the prayer stretch, SPT used tactile cue at scapulas to cue for her to sink her chest down to achieve a stretch through her lats.  Pt reported a pull at her biceps tendon. Continued limited  internal rotation and external rotation impacting her functional activities of daily living.   Skilled Intervention Adjusting resistance for appropriate warm-up. Making appropriate adjustments for resistance to progress mobility and strength.   Manual Therapy   Manual Therapy: Mobilization, MFR, MLD, friction massage minutes ([id number]) 25   Manual Therapy 1 PROM and STM/MFR   Manual Therapy 1 - Details STM with medium to firm pressure: lleft pectoral, left scapular muscles. PROM in supine each postion 2 min with grade 2 with firm end feel. IR improved ROM since last session and ER more limited.  Scapula mobility is restricted from inferior angle to superior angle.  Left shoulder ROM not within functional limits, continued skilled interventions will provide proper stretching to reach functional ROM.  (Simultaneous filing. User may not have seen previous data.)   Skilled Intervention PROM and STM/MFR to decrease pain and restriction to improve mobility   Patient Response/Progress improved ease of ROM   Education   Learner/Method Patient;Listening;Reading;Demonstration;Pictures/Video   Plan   Home program HEP provided with written and visual instructions.   Plan for next session wall slides   Comments   Comments Sue is a 34 year old female presenting to the clinic s/p shoulder arthroscopy with subacromial decompression, distal  clavicle excision and biceps tenodesis on 1/13/2025. Patient reports she is having problems with pain, swelling, loss of movements, stiffness, and loss of strength. She describes her pain as sharp, dull, aching, and shooting. She reports her pain is constant. Pain is worse with lifting, carrying, certain positions, reaching overhead and household tasks. Pain is better with rest, OTC medications, heat and cold. Patient had a 2 week follow-up and everything is looking good. Today is her first day without her sling. She has been doing her elbow ROM. She started her pendulums. She is on a 2lb weight lifting restriction starting Monday. No ER or shoulder extension. She reports her shoulder feels stiff. Sleep is difficult and she struggles to stay asleep. She is currently sleeping on the couch to prevent herself from rolling on her shoulder. She is only taking Tylenol and Ibuprofen to manage pain. She ices 2-3x's per day to manage pain. She heats her shoulder before pendulums.  has been helping her in the shower and getting dressed. She can get dressed on her own it just takes longer.  and mom help with IADLs.   Total Session Time   Timed Code Treatment Minutes 45   Total Treatment Time (sum of timed and untimed services) 45

## 2025-05-29 ENCOUNTER — THERAPY VISIT (OUTPATIENT)
Dept: PHYSICAL THERAPY | Facility: OTHER | Age: 35
End: 2025-05-29
Attending: ORTHOPAEDIC SURGERY
Payer: OTHER MISCELLANEOUS

## 2025-05-29 DIAGNOSIS — M75.42 SUBACROMIAL IMPINGEMENT OF LEFT SHOULDER: Primary | ICD-10-CM

## 2025-05-29 PROCEDURE — 97140 MANUAL THERAPY 1/> REGIONS: CPT | Mod: GP

## 2025-05-29 PROCEDURE — 97110 THERAPEUTIC EXERCISES: CPT | Mod: GP

## 2025-06-02 ENCOUNTER — THERAPY VISIT (OUTPATIENT)
Dept: PHYSICAL THERAPY | Facility: OTHER | Age: 35
End: 2025-06-02
Attending: ORTHOPAEDIC SURGERY
Payer: OTHER MISCELLANEOUS

## 2025-06-02 ENCOUNTER — OFFICE VISIT (OUTPATIENT)
Dept: ORTHOPEDICS | Facility: OTHER | Age: 35
End: 2025-06-02
Payer: OTHER MISCELLANEOUS

## 2025-06-02 DIAGNOSIS — M75.42 SUBACROMIAL IMPINGEMENT OF LEFT SHOULDER: Primary | ICD-10-CM

## 2025-06-02 DIAGNOSIS — Z98.890 S/P ARTHROSCOPY OF LEFT SHOULDER: Primary | ICD-10-CM

## 2025-06-02 PROCEDURE — 97530 THERAPEUTIC ACTIVITIES: CPT | Mod: GP

## 2025-06-02 PROCEDURE — 97110 THERAPEUTIC EXERCISES: CPT | Mod: GP

## 2025-06-02 PROCEDURE — 99213 OFFICE O/P EST LOW 20 MIN: CPT

## 2025-06-02 NOTE — PROGRESS NOTES
SUBJECTIVE:  Sue is a 34-year-old female now about 20 weeks status post a left shoulder arthroscopy with subacromial decompression and biceps tenodesis for a Worker's Comp. claim.  Today, Sue reports pain has essentially subsided.  She notes the only time she felt a twinge in the shoulder was when she lifted a 40 pound box overhead but this had no lasting effects.  Overall, she is quite happy with her outcome but plans to continue physical therapy with an emphasis on continued strengthening.  She feels ready to return to work without restrictions at this time.    OBJECTIVE:  34-year-old female alert and oriented in no acute distress.  She has near full range of motion of the left shoulder with no discomfort.  She has good strength the rotator cuff in all planes.  She has good biceps contour bilaterally.  She is neurovascularly intact.    ASSESSMENT/PLAN:  1. S/P arthroscopy of left shoulder (Primary)  34-year-old female now 20 weeks status post a left shoulder arthroscopy with subacromial decompression and biceps tenodesis, doing very well.  At this point we will return her to work without restrictions.  Complete remaining physical therapy sessions with an emphasis on strengthening and continue home exercise plan for an additional several months.  Will plan to follow-up as needed.

## 2025-06-09 ENCOUNTER — THERAPY VISIT (OUTPATIENT)
Dept: PHYSICAL THERAPY | Facility: OTHER | Age: 35
End: 2025-06-09
Attending: ORTHOPAEDIC SURGERY
Payer: OTHER MISCELLANEOUS

## 2025-06-09 DIAGNOSIS — M75.42 SUBACROMIAL IMPINGEMENT OF LEFT SHOULDER: Primary | ICD-10-CM

## 2025-06-09 PROCEDURE — 97110 THERAPEUTIC EXERCISES: CPT | Mod: GP

## 2025-06-16 ENCOUNTER — THERAPY VISIT (OUTPATIENT)
Dept: PHYSICAL THERAPY | Facility: OTHER | Age: 35
End: 2025-06-16
Attending: ORTHOPAEDIC SURGERY
Payer: OTHER MISCELLANEOUS

## 2025-06-16 DIAGNOSIS — M75.42 SUBACROMIAL IMPINGEMENT OF LEFT SHOULDER: Primary | ICD-10-CM

## 2025-06-16 PROCEDURE — 97140 MANUAL THERAPY 1/> REGIONS: CPT | Mod: GP

## 2025-06-16 PROCEDURE — 97110 THERAPEUTIC EXERCISES: CPT | Mod: GP

## 2025-06-18 ENCOUNTER — THERAPY VISIT (OUTPATIENT)
Dept: PHYSICAL THERAPY | Facility: OTHER | Age: 35
End: 2025-06-18
Attending: ORTHOPAEDIC SURGERY
Payer: OTHER MISCELLANEOUS

## 2025-06-18 DIAGNOSIS — M75.42 SUBACROMIAL IMPINGEMENT OF LEFT SHOULDER: Primary | ICD-10-CM

## 2025-06-18 PROCEDURE — 97140 MANUAL THERAPY 1/> REGIONS: CPT | Mod: GP

## 2025-06-18 PROCEDURE — 97110 THERAPEUTIC EXERCISES: CPT | Mod: GP

## 2025-06-25 ENCOUNTER — THERAPY VISIT (OUTPATIENT)
Dept: PHYSICAL THERAPY | Facility: OTHER | Age: 35
End: 2025-06-25
Attending: ORTHOPAEDIC SURGERY
Payer: OTHER MISCELLANEOUS

## 2025-06-25 DIAGNOSIS — M75.42 SUBACROMIAL IMPINGEMENT OF LEFT SHOULDER: Primary | ICD-10-CM

## 2025-06-25 PROCEDURE — 97110 THERAPEUTIC EXERCISES: CPT | Mod: GP

## 2025-07-03 ENCOUNTER — THERAPY VISIT (OUTPATIENT)
Dept: PHYSICAL THERAPY | Facility: OTHER | Age: 35
End: 2025-07-03
Attending: ORTHOPAEDIC SURGERY
Payer: OTHER MISCELLANEOUS

## 2025-07-03 DIAGNOSIS — M75.42 SUBACROMIAL IMPINGEMENT OF LEFT SHOULDER: Primary | ICD-10-CM

## 2025-07-03 PROCEDURE — 97110 THERAPEUTIC EXERCISES: CPT | Mod: GP

## 2025-07-11 NOTE — NURSING NOTE
"Patient presents to clinic for pre-op. Patient has L shoulder scope on 1/13/25 with [facility].    Chief Complaint   Patient presents with    Pre-Op Exam     L shoulder scope 1/13/25       FOOD SECURITY SCREENING QUESTIONS  Hunger Vital Signs:  Within the past 12 months we worried whether our food would run out before we got money to buy more. Never  Within the past 12 months the food we bought just didn't last and we didn't have money to get more. Never  Joan Dearmon 1/6/2025 9:17 AM      Initial /70 (BP Location: Right arm, Patient Position: Sitting, Cuff Size: Adult Regular)   Pulse 86   Temp (!) 96.7  F (35.9  C) (Temporal)   Resp 12   Ht 1.575 m (5' 2\")   Wt 77.6 kg (171 lb)   LMP 12/11/2024 (Within Days)   SpO2 94%   BMI 31.28 kg/m   Estimated body mass index is 31.28 kg/m  as calculated from the following:    Height as of this encounter: 1.575 m (5' 2\").    Weight as of this encounter: 77.6 kg (171 lb).  Medication Reconciliation: complete    Joan Deanachoon  "
Him/He

## (undated) DEVICE — TUBING ARTHROSCOPY PUMP ARTHREX AR-6410

## (undated) DEVICE — BUR ARTHREX COOLCUT OVAL 8 FLUTE 4.0MMX13CM AR-8400OBE

## (undated) DEVICE — ARTHROSCOPIC CANNULA TWIST-IN PURPLE 7MMX7CM AR-6570

## (undated) DEVICE — PREP CHLORAPREP 26ML TINTED ORANGE  260815

## (undated) DEVICE — TUBING SET ARTHREX DUALWAVE OUTFLOW AR-6430

## (undated) DEVICE — PACK SHOULDER ARTHROSCOPY SOP15SAFCA

## (undated) DEVICE — SOL WATER 1500ML

## (undated) DEVICE — GLOVE BIOGEL PI SZ 8.5 40885

## (undated) DEVICE — DRAPE U-SHAPED ORTHOARTS 60X70" 89331

## (undated) DEVICE — DRSG GAUZE 4X4" TRAY 6939

## (undated) DEVICE — SUCTION MANIFOLD NEPTUNE 2 SYS 4 PORT 0702-020-000

## (undated) DEVICE — NDL SPINAL 18GA 3.5" 405184

## (undated) DEVICE — GLOVE PROTEXIS PI ORTHO PF 9.0 2D73HT90

## (undated) DEVICE — CANNULA PASSPORT BUTTON 8X3CM AR-6592-08-30

## (undated) DEVICE — BUR ARTHREX COOLCUT EXCALIBUR 4.0MMX13CM AR-8400EX

## (undated) DEVICE — KIT SHOULDER POSITIONING BEACH CHAIR ARM HOLDER AR-1644

## (undated) DEVICE — DRAPE ARTHROSCOPY SHOULDER BEACHCHAIR 29369

## (undated) DEVICE — SU ETHILON 3-0 PS-2 18" 1669H

## (undated) DEVICE — DRSG ABDOMINAL PAD UNSTERILE 5X9" 9190

## (undated) DEVICE — DRAPE STERI U 1015

## (undated) DEVICE — ABLATOR ARTHREX APOLLO RF MP90 ASPIRATING 90DEG AR-9811

## (undated) DEVICE — SLEEVE COMPRESSION SCD KNEE MED 74022

## (undated) DEVICE — DRAPE STERI TOWEL LG 1010

## (undated) DEVICE — SU FIBERWIRE #2 FIBERSTICK 50"  AR-7209

## (undated) RX ORDER — LIDOCAINE HYDROCHLORIDE 10 MG/ML
INJECTION, SOLUTION EPIDURAL; INFILTRATION; INTRACAUDAL; PERINEURAL
Status: DISPENSED
Start: 2025-01-13

## (undated) RX ORDER — CEFAZOLIN SODIUM/WATER 2 G/20 ML
SYRINGE (ML) INTRAVENOUS
Status: DISPENSED
Start: 2025-01-13

## (undated) RX ORDER — ONDANSETRON 2 MG/ML
INJECTION INTRAMUSCULAR; INTRAVENOUS
Status: DISPENSED
Start: 2025-01-13

## (undated) RX ORDER — EPINEPHRINE 1 MG/ML
INJECTION INTRAMUSCULAR; INTRAVENOUS; SUBCUTANEOUS
Status: DISPENSED
Start: 2025-01-13

## (undated) RX ORDER — BUPIVACAINE HYDROCHLORIDE 5 MG/ML
INJECTION, SOLUTION EPIDURAL; INTRACAUDAL
Status: DISPENSED
Start: 2025-01-13

## (undated) RX ORDER — KETOROLAC TROMETHAMINE 30 MG/ML
INJECTION, SOLUTION INTRAMUSCULAR; INTRAVENOUS
Status: DISPENSED
Start: 2025-01-13

## (undated) RX ORDER — PROPOFOL 10 MG/ML
INJECTION, EMULSION INTRAVENOUS
Status: DISPENSED
Start: 2025-01-13

## (undated) RX ORDER — GLYCOPYRROLATE 0.2 MG/ML
INJECTION, SOLUTION INTRAMUSCULAR; INTRAVENOUS
Status: DISPENSED
Start: 2025-01-13

## (undated) RX ORDER — DEXAMETHASONE SODIUM PHOSPHATE 10 MG/ML
INJECTION, SOLUTION INTRAMUSCULAR; INTRAVENOUS
Status: DISPENSED
Start: 2024-09-10

## (undated) RX ORDER — BUPIVACAINE HYDROCHLORIDE 5 MG/ML
INJECTION, SOLUTION EPIDURAL; INTRACAUDAL
Status: DISPENSED
Start: 2024-09-10

## (undated) RX ORDER — LIDOCAINE HYDROCHLORIDE 10 MG/ML
INJECTION, SOLUTION INFILTRATION; PERINEURAL
Status: DISPENSED
Start: 2024-09-10

## (undated) RX ORDER — KETOROLAC TROMETHAMINE 30 MG/ML
INJECTION, SOLUTION INTRAMUSCULAR; INTRAVENOUS
Status: DISPENSED
Start: 2023-04-07

## (undated) RX ORDER — FENTANYL CITRATE 50 UG/ML
INJECTION, SOLUTION INTRAMUSCULAR; INTRAVENOUS
Status: DISPENSED
Start: 2025-01-13

## (undated) RX ORDER — DEXAMETHASONE SODIUM PHOSPHATE 10 MG/ML
INJECTION, SOLUTION INTRAMUSCULAR; INTRAVENOUS
Status: DISPENSED
Start: 2025-01-13